# Patient Record
Sex: MALE | Race: WHITE | Employment: FULL TIME | URBAN - METROPOLITAN AREA
[De-identification: names, ages, dates, MRNs, and addresses within clinical notes are randomized per-mention and may not be internally consistent; named-entity substitution may affect disease eponyms.]

---

## 2020-09-25 ENCOUNTER — HOSPITAL ENCOUNTER (OUTPATIENT)
Dept: NEUROLOGY | Age: 45
Discharge: HOME | End: 2020-09-25
Payer: COMMERCIAL

## 2020-09-25 PROCEDURE — 95886 MUSC TEST DONE W/N TEST COMP: CPT | Performed by: PSYCHIATRY & NEUROLOGY

## 2020-09-25 PROCEDURE — 95913 NRV CNDJ TEST 13/> STUDIES: CPT | Performed by: PSYCHIATRY & NEUROLOGY

## 2020-09-25 PROCEDURE — 95887 MUSC TST DONE W/N TST NONEXT: CPT

## 2020-09-25 PROCEDURE — 95913 NRV CNDJ TEST 13/> STUDIES: CPT

## 2020-09-25 NOTE — PROCEDURES
Lauryn  22.  Electrodiagnostic Laboratory   [place]           Full Name: Antonella Parker Gender: Male  MRN: [id number] YOB: 1975  Location[de-identified] [facility] (2)      Visit Date: 9/25/2020 07:52  Age: 54 Years 3 Months Old  Examining Physician: Dr. Bobetta Seip   Referring Physician: GERMAN Simons-CNP  Technician: Alissa Patterson   Height: 5 feet 8 inch  Weight: 160 lbs; BMI 24  Notes: Neuropathy       Motor NCS      Nerve / Sites Lat. Lat Diff Amplitude Amp. 1-2 Distance Velocity Temp.    ms ms mV % cm m/s °C   L Median - APB      Wrist 4.22  8.0 100 8  32.1      Elbow 10.21 5.99 7.3 92.2 22 37 32.1   L Ulnar - ADM      Wrist 4.22  8.5 100 8  32.1      B. Elbow 8.65 4.43 8.0 93.2 21 47 32.1      A. Elbow 11.35 2.71 7.9 92.5 10 37 32.1   L Peroneal - EDB      Ankle NR  NR NR 8  31.7      Fib head NR NR NR NR   31.9   R Peroneal - EDB      Ankle 5.83  1.0 100 8  31.7      Pop fossa 19.48 13.65 0.6 60.1 39 29 31.7   L Tibial - AH      Ankle 6.41  2.9 100 8  31.1      Pop fossa 23.23 16.82 1.2 42.3 42 25 31   L Peroneal - Tib Ant      Fib Head 3.59  3.5 100   31.7      Pop fossa 6.09 2.50 3.3 95.5 10 40 31.7       Sensory NCS      Nerve / Sites Onset Lat Peak Lat PP Amp Distance Velocity Temp.    ms ms µV cm m/s °C   L Median - Digit II (Antidromic)      Mid Palm NR NR NR 7 NR 31.7      Wrist NR NR NR 14 NR 31.7   L Ulnar - Digit V (Antidromic)      Wrist 3.91 4.79 19.7 14 36 31.9   L Superficial peroneal - Ankle      Lat leg NR NR NR 10 NR 31.7   R Superficial peroneal - Ankle      Lat leg NR NR NR 10 NR 31.7   L Sural - Ankle (Calf)      Calf NR NR NR 14 NR 30.7       F  Wave      Nerve F Lat M Lat F-M Lat    ms ms ms   R Peroneal - EDB 64.8 5.6 59.2   L Tibial - AH 79.1 7.6 71.5   L Median - APB 34.1 4.7 29.4   L Ulnar - ADM 35.8 3.1 32.8       H Reflex      Nerve Lat Hmax    ms   L Tibial - Soleus 39.4   R Tibial - Soleus NR       EMG         EMG Summary Table     Spontaneous MUAP Recruitment   Muscle IA Fib PSW Fasc H.F. Amp Dur. PPP Pattern   L. Lumbar paraspinals (mid) Incr None Few None CRDs N N N N   L. Lumbar paraspinals (low) Incr None 1+ None None N N N N   L. Sacral paraspinals Incr 1+ 1+ None None N N N N   L. Gluteus medius Incr Rare None None Nascent N N N N   L. Biceps femoris (short head) N None None None 2+ Nascent N N N N   L. Vastus lateralis N None None None None N 2+ 1+ N   L. Gastrocnemius (Medial head) Incr Few None None None N 2+ 1+ Decr   L. Tibialis anterior N None None None None N 2+ 1+ Decr   L. Flexor digitorum longus Incr None Few None None N 2+ N Decr   L. Extensor hallucis longus Incr Few Few None None N 2+ 1+ Decr   L. Extensor digitorum brevis N None None None None N N N Distant MUPs   L. thigh Adductor  N None None None None N 2+ 1+ Decr   L. Iliopsoas Incr 2+ 1+ None 1+ Nascent N 1+ 1+ Sl Decr         Nerve conduction studies in the left arm revealed the following abnormalities--- minimal prolongation of the distal motor latency of the left ulnar nerve at the wrist, with moderate motor conduction velocity slowing about the elbow segments, but without decremental motor responses. Motor conduction velocities in the left median and ulnar nerve were also minimally slow. Sensory responses were not obtained from the left median nerve. The distal sensory latency of the left ulnar nerve was delayed, with decreased antidromic sensory nerve conduction velocities. The left median and ulnar F-wave latencies were also prolonged. Nerve testings in both legs disclosed the following---absent superficial peroneal and left sural sensory nerve potentials. The left peroneal motor potential was absent, recording of the extensor digitorum brevis muscle, but obtained over the tibialis anterior.   The distal motor latencies of the right peroneal and left tibial nerves were delayed, with decreased compound motor action potentials and moderately decreased motor conduction velocities. The F-wave latencies of the right peroneal and left tibial nerves were markedly prolonged. The right H reflex was not obtained; the left H reflex was also delayed. These findings were compared to the referential values in this laboratory, available upon request.    Monopolar needle examination of the left leg revealed acute and chronic denervation changes in all muscles examined, more so distally. Needle testing of the paraspinals found acute and chronic denervation changes. Electrodiagnostic examination of the left arm and both legs disclosed evidence diagnostic of the following    1. Diffuse, symmetrical sensorimotor peripheral neuropathy of the axonal degenerative type, of marked severity. 2.  Diffuse left-sided lumbosacral motor radiculopathies or intracanalicular lesions, involving at least the L3-S1 motor roots. These were proven with the abnormal needle testing of the paraspinals. There were no other peripheral neuropathies. There were no other motor radiculopathies or intracanalicular lesions. Sensory radiculopathies cannot be evaluated by electrodiagnostic    Clinically, patient presented with longstanding, poorly controlled diabetes mellitus for many years. He recently noted increasing weakness of his left leg. On brief neurological examination, he displayed marked sensory loss to both mid thighs and both wrists, with weakness of both calf and foot muscles. There were also hammertoes in the left foot. He walks with bilateral foot drop. His difficulties are related to his severe diabetic polyneuropathy. In addition, his lumbosacral disease is contributing. His lumbosacral radiculopathy may be the result of diabetes alone versus compressive lesions. MRIs of the lumbosacral spine were recommended. Clinical correlation was highly advised.

## 2020-11-09 ENCOUNTER — HOSPITAL ENCOUNTER (OUTPATIENT)
Dept: GENERAL RADIOLOGY | Age: 45
Discharge: HOME | End: 2020-11-11
Payer: COMMERCIAL

## 2020-11-09 ENCOUNTER — HOSPITAL ENCOUNTER (OUTPATIENT)
Age: 45
Discharge: HOME | End: 2020-11-11
Payer: COMMERCIAL

## 2020-11-09 PROCEDURE — 71046 X-RAY EXAM CHEST 2 VIEWS: CPT

## 2020-11-25 ENCOUNTER — HOSPITAL ENCOUNTER (OUTPATIENT)
Dept: ULTRASOUND IMAGING | Age: 45
Discharge: HOME | End: 2020-11-27
Payer: COMMERCIAL

## 2020-11-25 PROCEDURE — 76700 US EXAM ABDOM COMPLETE: CPT

## 2020-12-02 ENCOUNTER — HOSPITAL ENCOUNTER (OUTPATIENT)
Dept: MRI IMAGING | Age: 45
Discharge: HOME | End: 2020-12-04
Payer: COMMERCIAL

## 2020-12-02 PROCEDURE — 72148 MRI LUMBAR SPINE W/O DYE: CPT

## 2021-04-28 NOTE — PROGRESS NOTES
[address] [facility]. Pierpont YUSEF Jasso, F.A.C.P. Sarah Fu, CALISTA, APRN, CNS  Jordan Beltran. Cathryn Cristobal, MSN, APRN-FNP-C  Berry Carlson MSN, APRN, FNP-C  Gina CALDERA PA-C  [address] MSN, APRN, FNP-C  [address], [address]  [place], [address] [address]  Phone: [phone]  Fax: [phone]       Humera Luu is a 54 y.o. right handed male     Past Medical History:     Past Medical History:   Diagnosis Date    Anxiety     HLD (hyperlipidemia)     HTN (hypertension)     OCD (obsessive compulsive disorder)     Type 2 diabetes mellitus ([facility] [address]      Past Surgical History:       Past Surgical History:   Procedure Laterality Date    FOOT TENDON SURGERY Right     INGUINAL HERNIA REPAIR      WRIST SURGERY Right      Allergies:       NKDA    Medications:     Current Outpatient Medications   Medication Sig Dispense Refill    DULoxetine (CYMBALTA) 60 MG extended release capsule Take 1 capsule by mouth daily      fluvoxaMINE (LUVOX) 50 MG tablet Take 1 tablet by mouth daily      lisinopril (PRINIVIL;ZESTRIL) 5 MG tablet Take 1 tablet by mouth daily      traZODone (DESYREL) 50 MG tablet Take 2 tablets by mouth nightly      atorvastatin (LIPITOR) 40 MG tablet Take 1 tablet by mouth daily      hydrOXYzine (VISTARIL) 25 MG capsule Take 1 capsule by mouth 3 times daily      LANTUS SOLOSTAR 100 UNIT/ML injection pen 20 Units every morning      metFORMIN (GLUCOPHAGE) 500 MG tablet Take 1 tablet by mouth 2 times daily      Cholecalciferol (VITAMIN D-3) 25 MCG (1000 UT) CAPS Take by mouth daily      Multiple Vitamins-Minerals (MULTIVITAMIN ADULTS PO) Take by mouth daily       No current facility-administered medications for this visit.       Social History:         with no biological children  Not working    Quit smoking 3 years ago--1/2 ppd 5 years; no ETOH, uses med marijuana    Review of Systems:     No chest pain or palpitations  No SOB  No vertigo, lightheadedness or loss of consciousness  No falls, tripping or stumbling  No incontinence of bowels or bladder  No itching or bruising appreciated  +numbness/tingling to BLE  + low back pain    ROS is otherwise negative    Family History:     No family history on file. History of Present Illness: The patient was referred by KIRILL Juares for neuropathy    He presents alone is a good historian with a significant past medical history of type 2 diabetes, HTN, HLD, OCD, anxiety. He was an uncontrolled diabetic for years, with hemoglobin A1cs >15. For the past 2 years, this has significantly improved, and A1Cs are now in the 7s. He has had numbness and tingling in both legs for several years, but this worsened over the past year and he now has difficulty walking--he feels off balance. He feels a tightness and locking feeling in both calves. He also notes stiffness and tightness in his low back, as well as a tingling sensation on the skin in his stomach while taking a shower. EDX/studies in Sept 2020 showed a severe diabetic axonal peripheral neuropathy as well as suggestion of left lumbosacral radiculopathy. MRI of the lumbar spine showed no major stenosis, rather a possible annular tear at L4-L5. He has been getting low back injections with , which has been somewhat helpful. He has never been through physical therapy. PCP started gabapentin for his dysesthesias, but this gave him suicidal thoughts. He is now on Cymbalta 60 mg and notes a 50% improvement. His wife checks his feet at night and he wears diabetic shoes. He does not exercise on his own due to his difficulties walking. No history of low back injuries to his knowledge or any other significant neurologic history.     Objective:     BP (!) 148/90 (Site: Left Upper Arm, Position: Sitting, Cuff Size: Medium Adult)   Pulse 73   Temp 97.9 °F (36.6 °C) (Infrared)   Resp 12   Ht 5' 9\" (1.753 m)   Wt 160 lb (72.6 kg)   SpO2 98% BMI 23.63 kg/m²     General appearance: alert, appears stated age, cooperative and in no distress  Head: normocephalic, without obvious abnormality, atraumatic  Eyes: conjunctivae/corneas clear; no drainage  Neck: no carotid bruit, limited ROM without cervicalgia  Back: symmetric, no curvature.  ROM normal.   Lungs: clear to auscultation bilaterally  Heart: regular rate and rhythm---no murmur  Abdomen: soft, non-tender; bowel sounds normal; no masses,  no organomegaly  Extremities: scattered areas of decreased hair growth up to mid calves; no cyanosis or edema  Pulses: 2+ and symmetric  Skin: Onychomycosis; mild stasis dermatitis to shins      Mental Status: alert and oriented x 4    Appropriate attention/concentration  Intact fundus of knowledge  Memories intact    Speech: no dysarthria  Language: no aphasias    Cranial Nerves:  I: smell    II: visual acuity     II: visual fields Full    II: pupils TIERA   III,VII: ptosis None   III,IV,VI: extraocular muscles  EOMI without nystagmus   V: mastication Normal   V: facial light touch sensation  Normal   V,VII: corneal reflex     VII: facial muscle function - upper  Normal   VII: facial muscle function - lower Normal   VIII: hearing Normal   IX: soft palate elevation  Normal   IX,X: gag reflex    XI: trapezius strength  5/5   XI: sternocleidomastoid strength 5/5   XI: neck extension strength  5/5   XII: tongue strength  Normal     Motor:  5/5 throughout except 4/5 b/l ant tibs and gastrocs  Decreased bulk in calves; normal  tone  No drift   No abnormal movements    Sensory:  LT \"feels strange\" BLE; normal in BUE  PP decreased in stocking glove distribution to mid shins bilaterally; normal BUE   Vibration moderately impaired at both wrists and significantly impaired at both ankles    Coordination:   FN, FFM and JADYN normal  HS slow but not ataxic bilaterally    Gait:  Wide-based; slow and antalgic    DTR:   Right Brachioradialis reflex 1+  Left Brachioradialis reflex 1+  Right Biceps reflex 1+  Left Biceps reflex 1+  Right Triceps reflex 1+  Left Triceps reflex 1+  Right Quadriceps reflex 3+  Left Quadriceps reflex 3+  Right Achilles reflex 2+  Left Achilles reflex 2+    No Babinskis  No Minor's    No other pathological reflexes    Laboratory/Radiology:  ry/Radiology:     Referral records reviewed under media tab    MRI lumbar spine Dec 2020: No fracture or malalignment. 2. Focus of abnormal signal associated with left paracentral annulus at level of L4/L5 which could indicate annular tear. 3. Mild circumferential central canal stenosis at level of L4/L5. 4. Mild right neural foraminal narrowing at L3/L4 and L4/L5. EMG 9/2020: Diffuse, symmetrical sensorimotor peripheral neuropathy of the axonal degenerative type, of marked severity. 2.  Diffuse left-sided lumbosacral motor radiculopathies or intracanalicular lesions, involving at least the L3-S1 motor roots. These were proven with the abnormal needle testing of the paraspinals. There were no other peripheral neuropathies. There were no other motor radiculopathies or intracanalicular lesions. Sensory radiculopathies cannot be evaluated by electrodiagnostic    All labs and images were personally reviewed at the time of this visit    Assessment:     Severe axonal sensorimotor diabetic peripheral neuropathy: Secondary to years of uncontrolled diabetes, and causing dysesthesias and sensory abnormalities in both legs. Pains under fairly good control on Cymbalta, but addition of alpha lipoic acid may also help. He would benefit from regular physical exercise for balance and gait training. Thankfully, his diabetes is now under control    LS radiculopathy: Possibly also due to diabetes as there were no major structural abnormalities on his MRI of the lumbar spine.   Small annular tear L4/L5 is most likely causing his low back pains, which may also benefit from PT    Diabetes mellitus type 2    Plan:     PT [facility]    Alpha lipoic acid 600 mg daily    Cymbalta per PCP    Keep diabetes under control    Encouraged independent exercise    Diabetic foot care and fall precautions reviewed    RTO in 6 mos or sooner MANUELA Ortiz, APRN-CNP  11:51 AM  4/28/2021    I spent 44 minutes with this patient obtaining the HPI and discussing the exam with greater than 50% of the time providing counseling and education on medications and other treatment plans. All questions were answered prior to leaving my office.

## 2021-04-29 ENCOUNTER — OFFICE VISIT (OUTPATIENT)
Dept: NEUROLOGY | Age: 46
End: 2021-04-29
Payer: COMMERCIAL

## 2021-04-29 VITALS
HEART RATE: 73 BPM | WEIGHT: 160 LBS | SYSTOLIC BLOOD PRESSURE: 148 MMHG | RESPIRATION RATE: 12 BRPM | BODY MASS INDEX: 23.7 KG/M2 | DIASTOLIC BLOOD PRESSURE: 90 MMHG | OXYGEN SATURATION: 98 % | TEMPERATURE: 97.9 F | HEIGHT: 69 IN

## 2021-04-29 DIAGNOSIS — E11.42 DIABETIC PERIPHERAL NEUROPATHY (HCC): Primary | ICD-10-CM

## 2021-04-29 DIAGNOSIS — M54.17 L-S RADICULOPATHY: ICD-10-CM

## 2021-04-29 PROCEDURE — 99204 OFFICE O/P NEW MOD 45 MIN: CPT | Performed by: NURSE PRACTITIONER

## 2021-04-29 RX ORDER — ATORVASTATIN CALCIUM 40 MG/1
1 TABLET, FILM COATED ORAL DAILY
COMMUNITY
Start: 2021-04-15

## 2021-04-29 RX ORDER — FLUVOXAMINE MALEATE 50 MG/1
1 TABLET, COATED ORAL DAILY
COMMUNITY
Start: 2021-04-15

## 2021-04-29 RX ORDER — INSULIN GLARGINE 100 [IU]/ML
20 INJECTION, SOLUTION SUBCUTANEOUS EVERY MORNING
COMMUNITY
Start: 2021-02-08

## 2021-04-29 RX ORDER — LISINOPRIL 5 MG/1
1 TABLET ORAL DAILY
COMMUNITY
Start: 2021-04-15

## 2021-04-29 RX ORDER — CHOLECALCIFEROL (VITAMIN D3) 25 MCG
1 CAPSULE ORAL DAILY
COMMUNITY

## 2021-04-29 RX ORDER — PERPHENAZINE 16 MG
600 TABLET ORAL DAILY
Qty: 30 CAPSULE | Refills: 11 | Status: SHIPPED
Start: 2021-04-29 | End: 2022-06-23

## 2021-04-29 RX ORDER — HYDROXYZINE PAMOATE 25 MG/1
1 CAPSULE ORAL 3 TIMES DAILY
COMMUNITY
Start: 2021-04-15

## 2021-04-29 RX ORDER — DULOXETIN HYDROCHLORIDE 60 MG/1
1 CAPSULE, DELAYED RELEASE ORAL DAILY
COMMUNITY
Start: 2021-04-10

## 2021-04-29 RX ORDER — TRAZODONE HYDROCHLORIDE 50 MG/1
2 TABLET ORAL NIGHTLY
COMMUNITY
Start: 2021-03-23

## 2021-04-29 SDOH — HEALTH STABILITY: MENTAL HEALTH: HOW OFTEN DO YOU HAVE A DRINK CONTAINING ALCOHOL?: NEVER

## 2021-04-29 SDOH — HEALTH STABILITY: MENTAL HEALTH: HOW MANY STANDARD DRINKS CONTAINING ALCOHOL DO YOU HAVE ON A TYPICAL DAY?: NOT ASKED

## 2021-04-29 NOTE — PATIENT INSTRUCTIONS
Patient Education        Diabetic Neuropathy: Care Instructions  Your Care Instructions     When you have diabetes, your blood sugar level may get too high. Over time, high blood sugar levels can damage nerves. This is called diabetic neuropathy. Nerve damage can cause pain, burning, tingling, and numbness and may leave you feeling weak. The feet are often affected. When you have nerve damage in your feet, you cannot feel your feet and toes as well as normal and may not notice cuts or sores. Even a small injury can lead to a serious infection. It is very important that you follow your doctor's advice on foot care. Sometimes diabetes damages nerves that help the body function. If this happens, your blood pressure, sweating, digestion, and urination might be affected. Your doctor may give you a target blood sugar level that is higher or lower than you are used to. Try to keep your blood sugar very close to this target level to prevent more damage. Follow-up care is a key part of your treatment and safety. Be sure to make and go to all appointments, and call your doctor if you are having problems. It's also a good idea to know your test results and keep a list of the medicines you take. How can you care for yourself at home? · Take your medicines exactly as prescribed. Call your doctor if you think you are having a problem with your medicine. It is very important that you take your insulin or diabetes pills as your doctor tells you. · Try to keep blood sugar at your target level. ? Eat a variety of healthy foods, with carbohydrate spread out in your meals. A dietitian can help you plan meals. ? Try to get at least 30 minutes of exercise on most days. ? Check your blood sugar as many times each day as your doctor recommends. · Take and record your blood pressure at home if your doctor tells you to. Learn the importance of the two measures of blood pressure (such as 130 over 80, or 130/80).  To take your blood pressure at home:  ? Ask your doctor to check your blood pressure monitor to be sure it is accurate and the cuff fits you. Also ask your doctor to watch you to make sure that you are using it right. ? Do not use medicine known to raise blood pressure (such as some nasal decongestant sprays) before taking your blood pressure. ? Avoid taking your blood pressure if you have just exercised or are nervous or upset. Rest at least 15 minutes before you take a reading. · Take pain medicines exactly as directed. ? If the doctor gave you a prescription medicine for pain, take it as prescribed. ? If you are not taking a prescription pain medicine, ask your doctor if you can take an over-the-counter medicine. · Do not smoke. Smoking can increase your chance for a heart attack or stroke. If you need help quitting, talk to your doctor about stop-smoking programs and medicines. These can increase your chances of quitting for good. · Limit alcohol to 2 drinks a day for men and 1 drink a day for women. Too much alcohol can cause health problems. · Eat small meals often, rather than 2 or 3 large meals a day. To care for your feet  · Prevent injury by wearing shoes at all times, even when you are indoors. · Do foot care as part of your daily routine. Wash your feet and then rub lotion on your feet, but not between your toes. Use a handheld mirror or magnifying mirror to inspect your feet for blisters, cuts, cracks, or sores. · Have your toenails trimmed and filed straight across. · Wear shoes and socks that fit well. Soft shoes that have good support and that fit well (such as tennis shoes) are best for your feet. · Check your shoes for any loose objects or rough edges before you put them on. · Ask your doctor to check your feet during each visit. Your doctor may notice a foot problem you have missed. · Get early treatment for any foot problem, even a minor one. When should you call for help?    Call your doctor now or seek immediate medical care if:    · You have symptoms of infection, such as:  ? Increased pain, swelling, warmth, or redness. ? Red streaks leading from the area. ? Pus draining from the area. ? A fever.     · You have new or worse numbness, pain, or tingling in any part of your body. Watch closely for changes in your health, and be sure to contact your doctor if:    · You have a new problem with your feet, such as:  ? A new sore or ulcer. ? A break in the skin that is not healing after several days. ? Bleeding corns or calluses. ? An ingrown toenail.     · You do not get better as expected. Where can you learn more? Go to https://BeFunky.Chiasma. org and sign in to your Everplans account. Enter L428 in the BeQuan box to learn more about \"Diabetic Neuropathy: Care Instructions. \"     If you do not have an account, please click on the \"Sign Up Now\" link. Current as of: August 31, 2020               Content Version: 12.8  © 0607-2839 Healthwise, Incorporated. Care instructions adapted under license by [facility] ([facility]). If you have questions about a medical condition or this instruction, always ask your healthcare professional. [address] any warranty or liability for your use of this information.

## 2021-04-30 ENCOUNTER — TELEPHONE (OUTPATIENT)
Dept: NEUROLOGY | Age: 46
End: 2021-04-30

## 2021-04-30 NOTE — TELEPHONE ENCOUNTER
Copy of neuro consult faxed through [employer] to [facility], APRN.   Electronically signed by Red Carlson on 4/30/21 at 3:46 PM EDT

## 2021-05-10 ENCOUNTER — HOSPITAL ENCOUNTER (OUTPATIENT)
Dept: PHYSICAL THERAPY | Age: 46
Setting detail: THERAPIES SERIES
Discharge: HOME | End: 2021-05-10
Payer: COMMERCIAL

## 2021-05-10 PROCEDURE — 97161 PT EVAL LOW COMPLEX 20 MIN: CPT

## 2021-05-10 NOTE — PROGRESS NOTES
Physical Therapy  Initial Assessment  Date: 5/10/2021  Patient Name: Miguel Holbrook  MRN: [id number]  : 1975          Restrictions       Subjective   General  Chart Reviewed: Yes  Patient assessed for rehabilitation services?: Yes  Additional Pertinent Hx: Patient presents to PT to assess and address issues related to chronic bilateral lower leg Neuropathy symptoms. Symptoms have been present 5+ years with progressive worsening  Family / Caregiver Present: No  Referring Practitioner: Nancy Mayer CNP  Referral Date : 21  Diagnosis: Peripheral Neuropathy  Follows Commands: Within Functional Limits  PT Visit Information  Onset Date: 21  PT Insurance Information: BC/BS  Subjective  Subjective: Altered sensation is constant Limited mobility/strength of bilateral feet/ankles Impaired mobility Deficits of balance Pain/sensitivity in the lower legs  Pain Screening  Patient Currently in Pain: Yes  Vital Signs  Patient Currently in Pain: Yes    Vision/Hearing  Vision  Vision: Within Functional Limits  Hearing  Hearing: Within functional limits    Orientation  Orientation  Overall Orientation Status: Within Functional Limits    Social/Functional History  Social/Functional History  Lives With: Family  Type of Home: House  Home Layout: Multi-level  Receives Help From: Family  Homemaking Responsibilities: Yes  Active : Yes  Mode of Transportation: Car  Occupation: Unemployed    Objective     Observation/Palpation  Posture: Fair  Observation: Gait: Antalgic Wide base of support with reduced step length Limited hip/knee flexion noted with ambulation. Movements are hesitant and guarded with patient often seeking handholds to secure balance.  Movements are controlled but require significant effort and result in rapid onset of fatigue  Edema: None noted    AROM RLE (degrees)  RLE General AROM: Limited ankle/foot mobility  AROM LLE (degrees)  LLE General AROM: Limited ankle/fioot mobility    Strength RLE  Strength RLE: Time Out 1540         Minutes 40         Timed Code Treatment Minutes: [address], PT

## 2021-07-04 ENCOUNTER — APPOINTMENT (OUTPATIENT)
Dept: CT IMAGING | Age: 46
End: 2021-07-04
Payer: COMMERCIAL

## 2021-07-04 ENCOUNTER — HOSPITAL ENCOUNTER (EMERGENCY)
Age: 46
Discharge: HOME | End: 2021-07-04
Attending: EMERGENCY MEDICINE
Payer: COMMERCIAL

## 2021-07-04 ENCOUNTER — APPOINTMENT (OUTPATIENT)
Dept: GENERAL RADIOLOGY | Age: 46
End: 2021-07-04
Payer: COMMERCIAL

## 2021-07-04 VITALS
HEART RATE: 62 BPM | DIASTOLIC BLOOD PRESSURE: 69 MMHG | BODY MASS INDEX: 23.63 KG/M2 | OXYGEN SATURATION: 99 % | TEMPERATURE: 97.7 F | SYSTOLIC BLOOD PRESSURE: 107 MMHG | HEIGHT: 69 IN | RESPIRATION RATE: 16 BRPM

## 2021-07-04 DIAGNOSIS — R11.2 NON-INTRACTABLE VOMITING WITH NAUSEA, UNSPECIFIED VOMITING TYPE: Primary | ICD-10-CM

## 2021-07-04 LAB
ALBUMIN SERPL-MCNC: 4.7 G/DL (ref 3.5–5.2)
ALP BLD-CCNC: 90 U/L (ref 40–129)
ALT SERPL-CCNC: 15 U/L (ref 0–40)
AMPHETAMINE SCREEN, URINE: NOT DETECTED
ANION GAP SERPL CALCULATED.3IONS-SCNC: 12 MMOL/L (ref 7–16)
AST SERPL-CCNC: 16 U/L (ref 0–39)
BACTERIA: NORMAL /HPF
BARBITURATE SCREEN URINE: NOT DETECTED
BASOPHILS ABSOLUTE: 0.03 E9/L (ref 0–0.2)
BASOPHILS RELATIVE PERCENT: 0.4 % (ref 0–2)
BENZODIAZEPINE SCREEN, URINE: NOT DETECTED
BILIRUB SERPL-MCNC: 0.8 MG/DL (ref 0–1.2)
BILIRUBIN URINE: NEGATIVE
BLOOD, URINE: NEGATIVE
BUN BLDV-MCNC: 18 MG/DL (ref 6–20)
CALCIUM SERPL-MCNC: 9.8 MG/DL (ref 8.6–10.2)
CANNABINOID SCREEN URINE: POSITIVE
CHLORIDE BLD-SCNC: 94 MMOL/L (ref 98–107)
CHP ED QC CHECK: YES
CLARITY: CLEAR
CO2: 28 MMOL/L (ref 22–29)
COCAINE METABOLITE SCREEN URINE: NOT DETECTED
COLOR: YELLOW
CREAT SERPL-MCNC: 1.1 MG/DL (ref 0.7–1.2)
EKG ATRIAL RATE: 62 BPM
EKG ATRIAL RATE: 63 BPM
EKG P-R INTERVAL: 136 MS
EKG P-R INTERVAL: 140 MS
EKG Q-T INTERVAL: 410 MS
EKG Q-T INTERVAL: 430 MS
EKG QRS DURATION: 80 MS
EKG QRS DURATION: 82 MS
EKG QTC CALCULATION (BAZETT): 419 MS
EKG QTC CALCULATION (BAZETT): 436 MS
EKG R AXIS: 23 DEGREES
EKG R AXIS: 57 DEGREES
EKG T AXIS: 24 DEGREES
EKG T AXIS: 51 DEGREES
EKG VENTRICULAR RATE: 62 BPM
EKG VENTRICULAR RATE: 63 BPM
EOSINOPHILS ABSOLUTE: 0.03 E9/L (ref 0.05–0.5)
EOSINOPHILS RELATIVE PERCENT: 0.4 % (ref 0–6)
FENTANYL SCREEN, URINE: NOT DETECTED
GFR AFRICAN AMERICAN: >60
GFR NON-AFRICAN AMERICAN: >60 ML/MIN/1.73
GLUCOSE BLD-MCNC: 179 MG/DL
GLUCOSE BLD-MCNC: 211 MG/DL (ref 74–99)
GLUCOSE URINE: NEGATIVE MG/DL
HCT VFR BLD CALC: 45.1 % (ref 37–54)
HEMOGLOBIN: 15.2 G/DL (ref 12.5–16.5)
HYALINE CASTS: NORMAL /LPF (ref 0–2)
IMMATURE GRANULOCYTES #: 0.02 E9/L
IMMATURE GRANULOCYTES %: 0.2 % (ref 0–5)
KETONES, URINE: 15 MG/DL
LEUKOCYTE ESTERASE, URINE: NEGATIVE
LIPASE: 112 U/L (ref 13–60)
LYMPHOCYTES ABSOLUTE: 2.94 E9/L (ref 1.5–4)
LYMPHOCYTES RELATIVE PERCENT: 35.6 % (ref 20–42)
Lab: ABNORMAL
MCH RBC QN AUTO: 29.4 PG (ref 26–35)
MCHC RBC AUTO-ENTMCNC: 33.7 % (ref 32–34.5)
MCV RBC AUTO: 87.2 FL (ref 80–99.9)
METER GLUCOSE: 179 MG/DL (ref 74–99)
METHADONE SCREEN, URINE: NOT DETECTED
MONOCYTES ABSOLUTE: 0.49 E9/L (ref 0.1–0.95)
MONOCYTES RELATIVE PERCENT: 5.9 % (ref 2–12)
NEUTROPHILS ABSOLUTE: 4.76 E9/L (ref 1.8–7.3)
NEUTROPHILS RELATIVE PERCENT: 57.5 % (ref 43–80)
NITRITE, URINE: NEGATIVE
OPIATE SCREEN URINE: NOT DETECTED
OXYCODONE URINE: NOT DETECTED
PDW BLD-RTO: 12.1 FL (ref 11.5–15)
PH UA: 6.5 (ref 5–9)
PHENCYCLIDINE SCREEN URINE: NOT DETECTED
PLATELET # BLD: 364 E9/L (ref 130–450)
PMV BLD AUTO: 8.4 FL (ref 7–12)
POTASSIUM REFLEX MAGNESIUM: 4.1 MMOL/L (ref 3.5–5)
PROTEIN UA: ABNORMAL MG/DL
RBC # BLD: 5.17 E12/L (ref 3.8–5.8)
RBC UA: NORMAL /HPF (ref 0–2)
SODIUM BLD-SCNC: 134 MMOL/L (ref 132–146)
SPECIFIC GRAVITY UA: 1.01 (ref 1–1.03)
TOTAL PROTEIN: 8.2 G/DL (ref 6.4–8.3)
UROBILINOGEN, URINE: 0.2 E.U./DL
WBC # BLD: 8.3 E9/L (ref 4.5–11.5)
WBC UA: NORMAL /HPF (ref 0–5)

## 2021-07-04 PROCEDURE — 6360000002 HC RX W HCPCS: Performed by: STUDENT IN AN ORGANIZED HEALTH CARE EDUCATION/TRAINING PROGRAM

## 2021-07-04 PROCEDURE — 83690 ASSAY OF LIPASE: CPT

## 2021-07-04 PROCEDURE — 2580000003 HC RX 258: Performed by: STUDENT IN AN ORGANIZED HEALTH CARE EDUCATION/TRAINING PROGRAM

## 2021-07-04 PROCEDURE — 71045 X-RAY EXAM CHEST 1 VIEW: CPT

## 2021-07-04 PROCEDURE — 93010 ELECTROCARDIOGRAM REPORT: CPT | Performed by: INTERNAL MEDICINE

## 2021-07-04 PROCEDURE — 96375 TX/PRO/DX INJ NEW DRUG ADDON: CPT

## 2021-07-04 PROCEDURE — 80307 DRUG TEST PRSMV CHEM ANLYZR: CPT

## 2021-07-04 PROCEDURE — 80053 COMPREHEN METABOLIC PANEL: CPT

## 2021-07-04 PROCEDURE — 6360000004 HC RX CONTRAST MEDICATION: Performed by: EMERGENCY MEDICINE

## 2021-07-04 PROCEDURE — 74177 CT ABD & PELVIS W/CONTRAST: CPT

## 2021-07-04 PROCEDURE — 96374 THER/PROPH/DIAG INJ IV PUSH: CPT

## 2021-07-04 PROCEDURE — 81001 URINALYSIS AUTO W/SCOPE: CPT

## 2021-07-04 PROCEDURE — 93005 ELECTROCARDIOGRAM TRACING: CPT | Performed by: STUDENT IN AN ORGANIZED HEALTH CARE EDUCATION/TRAINING PROGRAM

## 2021-07-04 PROCEDURE — 99284 EMERGENCY DEPT VISIT MOD MDM: CPT

## 2021-07-04 PROCEDURE — 85025 COMPLETE CBC W/AUTO DIFF WBC: CPT

## 2021-07-04 PROCEDURE — 2500000003 HC RX 250 WO HCPCS: Performed by: STUDENT IN AN ORGANIZED HEALTH CARE EDUCATION/TRAINING PROGRAM

## 2021-07-04 RX ORDER — FAMOTIDINE 20 MG/1
20 TABLET, FILM COATED ORAL 2 TIMES DAILY
Qty: 60 TABLET | Refills: 0 | Status: SHIPPED | OUTPATIENT
Start: 2021-07-04 | End: 2022-06-23

## 2021-07-04 RX ORDER — DIPHENHYDRAMINE HYDROCHLORIDE 50 MG/ML
25 INJECTION INTRAMUSCULAR; INTRAVENOUS ONCE
Status: COMPLETED | OUTPATIENT
Start: 2021-07-04 | End: 2021-07-04

## 2021-07-04 RX ORDER — ONDANSETRON 4 MG/1
4 TABLET, FILM COATED ORAL 3 TIMES DAILY PRN
Qty: 15 TABLET | Refills: 0 | Status: SHIPPED | OUTPATIENT
Start: 2021-07-04 | End: 2022-06-23

## 2021-07-04 RX ORDER — METOCLOPRAMIDE HYDROCHLORIDE 5 MG/ML
10 INJECTION INTRAMUSCULAR; INTRAVENOUS ONCE
Status: COMPLETED | OUTPATIENT
Start: 2021-07-04 | End: 2021-07-04

## 2021-07-04 RX ORDER — 0.9 % SODIUM CHLORIDE 0.9 %
1000 INTRAVENOUS SOLUTION INTRAVENOUS ONCE
Status: COMPLETED | OUTPATIENT
Start: 2021-07-04 | End: 2021-07-04

## 2021-07-04 RX ADMIN — IOPAMIDOL 75 ML: 755 INJECTION, SOLUTION INTRAVENOUS at 11:37

## 2021-07-04 RX ADMIN — DIPHENHYDRAMINE HYDROCHLORIDE 25 MG: 50 INJECTION INTRAMUSCULAR; INTRAVENOUS at 10:01

## 2021-07-04 RX ADMIN — METOCLOPRAMIDE 10 MG: 5 INJECTION, SOLUTION INTRAMUSCULAR; INTRAVENOUS at 10:01

## 2021-07-04 RX ADMIN — FAMOTIDINE 20 MG: 10 INJECTION, SOLUTION INTRAVENOUS at 10:01

## 2021-07-04 RX ADMIN — SODIUM CHLORIDE 1000 ML: 9 INJECTION, SOLUTION INTRAVENOUS at 10:00

## 2021-07-04 ASSESSMENT — ENCOUNTER SYMPTOMS
EYE REDNESS: 0
CHEST TIGHTNESS: 0
TROUBLE SWALLOWING: 0
EYE PAIN: 0
VOMITING: 1
PHOTOPHOBIA: 0
SORE THROAT: 0
RHINORRHEA: 0
DIARRHEA: 0
APNEA: 0
BACK PAIN: 0
SHORTNESS OF BREATH: 0
COUGH: 0
WHEEZING: 0
ABDOMINAL PAIN: 1
CONSTIPATION: 0
NAUSEA: 1

## 2021-07-04 NOTE — ED PROVIDER NOTES
[address]      Pt Name: Loly Jackson  MRN: [id number]  [place] 1975  Date of evaluation: 7/4/2021      CHIEF COMPLAINT       Chief Complaint   Patient presents with    Emesis     x 1 week    Other     having blood sugar problems        HPI  Loly Jackson is a 51 y.o. male history of hypertension, hyperlipidemia, type 2 diabetes who presents to the emergency [facility] with nausea and vomiting. Patient states over the last week or so he has had near constant nausea and multiple episodes of vomiting. States he has been unable to keep down food. States he has near constant nausea. Describes as constant, moderate, nothing makes it better or worse. He states that he is insulin-dependent type 2 diabetic. States he has had several episodes where he becomes hypoglycemic. He states that he is a marijuana card and uses marijuana frequently. He states over the last 2 years he unintentionally has lost about 100 pounds. He follows with his PCP and states has had multiple tests done to try to figure out why he does not have an appetite and why he has intermittent nausea and vomiting. Came to the ED today because he felt his nausea was unbearable. Except as noted above the remainder of the review of systems was reviewed and negative. Review of Systems   Constitutional: Negative for activity change, chills, diaphoresis, fatigue and fever. HENT: Negative for rhinorrhea, sore throat and trouble swallowing. Eyes: Negative for photophobia, pain and redness. Respiratory: Negative for apnea, cough, chest tightness, shortness of breath and wheezing. Cardiovascular: Negative for chest pain, palpitations and leg swelling. Gastrointestinal: Positive for abdominal pain, nausea and vomiting. Negative for constipation and diarrhea. Endocrine: Negative for polyuria.    Genitourinary: Negative for difficulty urinating and dysuria. Musculoskeletal: Negative for back pain, neck pain and neck stiffness. Skin: Negative for pallor and rash. Neurological: Negative for dizziness, syncope, weakness, light-headedness and numbness. Psychiatric/Behavioral: Negative for confusion. The patient is not nervous/anxious. Physical Exam  Vitals and nursing note reviewed. Constitutional:       General: He is not in acute distress. Appearance: He is well-developed. Comments: Awake and alert. Sitting in the gurney in no obvious distress. HENT:      Head: Normocephalic and atraumatic. Mouth/Throat:      Mouth: Mucous membranes are moist.      Pharynx: No oropharyngeal exudate. Eyes:      General: No scleral icterus. Pupils: Pupils are equal, round, and reactive to light. Cardiovascular:      Rate and Rhythm: Normal rate and regular rhythm. Heart sounds: Normal heart sounds. No murmur heard. Comments: 2+ radial and dorsal pedis pulses bilaterally  Pulmonary:      Effort: Pulmonary effort is normal. No respiratory distress. Breath sounds: Normal breath sounds. No wheezing. Abdominal:      Palpations: Abdomen is soft. Tenderness: There is no abdominal tenderness. There is no guarding or rebound. Musculoskeletal:         General: No tenderness or deformity. Normal range of motion. Cervical back: Normal range of motion and neck supple. Right lower leg: No edema. Left lower leg: No edema. Skin:     General: Skin is warm and dry. Capillary Refill: Capillary refill takes less than 2 seconds. Findings: No rash. Neurological:      General: No focal deficit present. Mental Status: He is alert and oriented to person, place, and time. Cranial Nerves: No cranial nerve deficit. Sensory: No sensory deficit. Motor: No weakness or abnormal muscle tone.    Psychiatric:         Mood and Affect: Mood normal.         Behavior: Behavior normal.          Procedures MDM   This is a 79-year-old male with history of hyperlipidemia, hypertension, type 2 diabetes who presents to the emergency department with nausea and vomiting. In the emergency department patient is awake and alert, hemodynamic stable, afebrile in no respiratory distress. CT abdomen pelvis shows no acute intra-abdominal pathology. Lipase not consistent with acute pancreatitis. No signs of biliary etiology. Patient feeling much better after supportive therapy with Pepcid and Zofran in the ED. Abdominal exam repeated and showed again soft benign abdomen. Metabolic panel showed normal electrolytes, normal renal function. Patient not in DKA. Talk screen positive for THC. Had an extensive discussion with the patient about cannabinoid hyperemesis syndrome and that some of his symptoms may be related to this. Discussed with him plan for close outpatient follow-up as well as return precautions patient understands and agrees with plan. Is able to tolerate p.o. without difficulty prior to discharge.                    --------------------------------------------- PAST HISTORY ---------------------------------------------  Past Medical History:  has a past medical history of Anxiety, HLD (hyperlipidemia), HTN (hypertension), OCD (obsessive compulsive disorder), and Type 2 diabetes mellitus ([facility] Past Surgical History:  has a past surgical history that includes Wrist surgery (Right); Inguinal hernia repair; and Foot Tendon Surgery (Right). Social History:  reports that he has never smoked. His smokeless tobacco use includes chew. He reports current drug use. Drug: Marijuana. He reports that he does not drink alcohol. Family History: family history includes Diabetes in his father. The patients home medications have been reviewed. Allergies: Patient has no known allergies.     -------------------------------------------------- RESULTS -------------------------------------------------  Labs:  Results for orders placed or performed during the hospital encounter of 07/04/21   CBC Auto Differential   Result Value Ref Range    WBC 8.3 4.5 - 11.5 E9/L    RBC 5.17 3.80 - 5.80 E12/L    Hemoglobin 15.2 12.5 - 16.5 g/dL    Hematocrit 45.1 37.0 - 54.0 %    MCV 87.2 80.0 - 99.9 fL    MCH 29.4 26.0 - 35.0 pg    MCHC 33.7 32.0 - 34.5 %    RDW 12.1 11.5 - 15.0 fL    Platelets 173 092 - 067 E9/L    MPV 8.4 7.0 - 12.0 fL    Neutrophils % 57.5 43.0 - 80.0 %    Immature Granulocytes % 0.2 0.0 - 5.0 %    Lymphocytes % 35.6 20.0 - 42.0 %    Monocytes % 5.9 2.0 - 12.0 %    Eosinophils % 0.4 0.0 - 6.0 %    Basophils % 0.4 0.0 - 2.0 %    Neutrophils Absolute 4.76 1.80 - 7.30 E9/L    Immature Granulocytes # 0.02 E9/L    Lymphocytes Absolute 2.94 1.50 - 4.00 E9/L    Monocytes Absolute 0.49 0.10 - 0.95 E9/L    Eosinophils Absolute 0.03 (L) 0.05 - 0.50 E9/L    Basophils Absolute 0.03 0.00 - 0.20 E9/L   Comprehensive Metabolic Panel w/ Reflex to MG   Result Value Ref Range    Sodium 134 132 - 146 mmol/L    Potassium reflex Magnesium 4.1 3.5 - 5.0 mmol/L    Chloride 94 (L) 98 - 107 mmol/L    CO2 28 22 - 29 mmol/L    Anion Gap 12 7 - 16 mmol/L    Glucose 211 (H) 74 - 99 mg/dL    BUN 18 6 - 20 mg/dL    CREATININE 1.1 0.7 - 1.2 mg/dL    GFR Non-African American >60 >=60 mL/min/1.73    GFR African American >60     Calcium 9.8 8.6 - 10.2 mg/dL    Total Protein 8.2 6.4 - 8.3 g/dL    Albumin 4.7 3.5 - 5.2 g/dL    Total Bilirubin 0.8 0.0 - 1.2 mg/dL    Alkaline Phosphatase 90 40 - 129 U/L    ALT 15 0 - 40 U/L    AST 16 0 - 39 U/L   Lipase   Result Value Ref Range    Lipase 112 (H) 13 - 60 U/L   Urinalysis, reflex to microscopic   Result Value Ref Range    Color, UA Yellow Straw/Yellow    Clarity, UA Clear Clear    Glucose, Ur Negative Negative mg/dL    Bilirubin Urine Negative Negative    Ketones, Urine 15 (A) Negative mg/dL    Specific Gravity, UA 1.015 1.005 - 1.030    Blood, Urine Negative Negative    pH, UA 6.5 5.0 - 9.0    Protein, UA TRACE Negative mg/dL    Urobilinogen, Urine 0.2 <2.0 E.U./dL    Nitrite, Urine Negative Negative    Leukocyte Esterase, Urine Negative Negative   URINE DRUG SCREEN   Result Value Ref Range    Amphetamine Screen, Urine NOT DETECTED Negative <1000 ng/mL    Barbiturate Screen, Ur NOT DETECTED Negative < 200 ng/mL    Benzodiazepine Screen, Urine NOT DETECTED Negative < 200 ng/mL    Cannabinoid Scrn, Ur POSITIVE (A) Negative < 50ng/mL    Cocaine Metabolite Screen, Urine NOT DETECTED Negative < 300 ng/mL    Opiate Scrn, Ur NOT DETECTED Negative < 300ng/mL    PCP Screen, Urine NOT DETECTED Negative < 25 ng/mL    Methadone Screen, Urine NOT DETECTED Negative <300 ng/mL    Oxycodone Urine NOT DETECTED Negative <100 ng/mL    FENTANYL SCREEN, URINE NOT DETECTED Negative <1 ng/mL    Drug Screen Comment: see below    Microscopic Urinalysis   Result Value Ref Range    Hyaline Casts, UA 0-2 0 - 2 /LPF    WBC, UA 1-3 0 - 5 /HPF    RBC, UA 0-1 0 - 2 /HPF    Bacteria, UA NONE SEEN None Seen /HPF   POCT Glucose   Result Value Ref Range    Meter Glucose 179 (H) 74 - 99 mg/dL   POCT Glucose   Result Value Ref Range    Glucose 179 mg/dL    QC OK? yes    EKG 12 Lead   Result Value Ref Range    Ventricular Rate 62 BPM    Atrial Rate 62 BPM    P-R Interval 140 ms    QRS Duration 82 ms    Q-T Interval 430 ms    QTc Calculation (Bazett) 436 ms    R Axis 57 degrees    T Axis 51 degrees   EKG 12 Lead   Result Value Ref Range    Ventricular Rate 63 BPM    Atrial Rate 63 BPM    P-R Interval 136 ms    QRS Duration 80 ms    Q-T Interval 410 ms    QTc Calculation (Bazett) 419 ms    R Axis 23 degrees    T Axis 24 degrees       Radiology:  CT ABDOMEN PELVIS W IV CONTRAST Additional Contrast? None   Final Result   1. Hepatic steatosis. There is no hepatic mass. 2. There are no findings of inflammatory bowel disease, acute cholecystitis   or appendicitis.   Bilateral 2 cm right and left renal cyst.   3. Small right inguinal hernia containing mesenteric fat and minimal amount   of fluid. XR CHEST PORTABLE   Final Result   No acute process. ------------------------- NURSING NOTES AND VITALS REVIEWED ---------------------------  Date / Time Roomed:  7/4/2021  9:35 AM  ED Bed Assignment:  12/12    The nursing notes within the ED encounter and vital signs as below have been reviewed. /71   Pulse 60   Temp 97.7 °F (36.5 °C) (Oral)   Resp 16   Ht 5' 9\" (1.753 m)   SpO2 100%   BMI 23.63 kg/m²   Oxygen Saturation Interpretation: Normal      ------------------------------------------ PROGRESS NOTES ------------------------------------------  1:00 PM EDT  I have spoken with the patient and discussed todays results, in addition to providing specific details for the plan of care and counseling regarding the diagnosis and prognosis. Their questions are answered at this time and they are agreeable with the plan. I discussed at length with them reasons for immediate return here for re evaluation. They will followup with their primary care physician by calling their office tomorrow. --------------------------------- ADDITIONAL PROVIDER NOTES ---------------------------------  At this time the patient is without objective evidence of an acute process requiring hospitalization or inpatient management. They have remained hemodynamically stable throughout their entire ED visit and are stable for discharge with outpatient follow-up. The plan has been discussed in detail and they are aware of the specific conditions for emergent return, as well as the importance of follow-up. New Prescriptions    FAMOTIDINE (PEPCID) 20 MG TABLET    Take 1 tablet by mouth 2 times daily    ONDANSETRON (ZOFRAN) 4 MG TABLET    Take 1 tablet by mouth 3 times daily as needed for Nausea or Vomiting       Diagnosis:  1.  Non-intractable vomiting with nausea, unspecified vomiting type Disposition:  Patient's disposition: Discharge to home  Patient's condition is stable.        Patt Alba DO  Resident  07/04/21 1943

## 2022-04-13 ENCOUNTER — HOSPITAL ENCOUNTER (EMERGENCY)
Age: 47
Discharge: HOME | End: 2022-04-13
Attending: EMERGENCY MEDICINE
Payer: COMMERCIAL

## 2022-04-13 ENCOUNTER — APPOINTMENT (OUTPATIENT)
Dept: GENERAL RADIOLOGY | Age: 47
End: 2022-04-13
Payer: COMMERCIAL

## 2022-04-13 VITALS
RESPIRATION RATE: 16 BRPM | WEIGHT: 190 LBS | HEIGHT: 69 IN | HEART RATE: 106 BPM | OXYGEN SATURATION: 100 % | TEMPERATURE: 98.2 F | BODY MASS INDEX: 28.14 KG/M2 | SYSTOLIC BLOOD PRESSURE: 149 MMHG | DIASTOLIC BLOOD PRESSURE: 95 MMHG

## 2022-04-13 DIAGNOSIS — E86.0 MILD DEHYDRATION: ICD-10-CM

## 2022-04-13 DIAGNOSIS — R11.2 NON-INTRACTABLE VOMITING WITH NAUSEA, UNSPECIFIED VOMITING TYPE: ICD-10-CM

## 2022-04-13 DIAGNOSIS — J10.1 INFLUENZA A: Primary | ICD-10-CM

## 2022-04-13 LAB
ALBUMIN SERPL-MCNC: 4.4 G/DL (ref 3.5–5.2)
ALP BLD-CCNC: 107 U/L (ref 40–129)
ALT SERPL-CCNC: 14 U/L (ref 0–40)
ANION GAP SERPL CALCULATED.3IONS-SCNC: 17 MMOL/L (ref 7–16)
AST SERPL-CCNC: 12 U/L (ref 0–39)
BACTERIA: ABNORMAL /HPF
BASOPHILS ABSOLUTE: 0.01 E9/L (ref 0–0.2)
BASOPHILS RELATIVE PERCENT: 0.1 % (ref 0–2)
BETA-HYDROXYBUTYRATE: 1.3 MMOL/L (ref 0.02–0.27)
BILIRUB SERPL-MCNC: 0.6 MG/DL (ref 0–1.2)
BILIRUBIN URINE: NEGATIVE
BLOOD, URINE: NEGATIVE
BUN BLDV-MCNC: 31 MG/DL (ref 6–20)
CALCIUM SERPL-MCNC: 9.6 MG/DL (ref 8.6–10.2)
CHLORIDE BLD-SCNC: 88 MMOL/L (ref 98–107)
CLARITY: CLEAR
CO2: 27 MMOL/L (ref 22–29)
COLOR: YELLOW
CREAT SERPL-MCNC: 1.3 MG/DL (ref 0.7–1.2)
EOSINOPHILS ABSOLUTE: 0 E9/L (ref 0.05–0.5)
EOSINOPHILS RELATIVE PERCENT: 0 % (ref 0–6)
GFR AFRICAN AMERICAN: >60
GFR NON-AFRICAN AMERICAN: 59 ML/MIN/1.73
GLUCOSE BLD-MCNC: 327 MG/DL (ref 74–99)
GLUCOSE URINE: >=1000 MG/DL
HCT VFR BLD CALC: 45.9 % (ref 37–54)
HEMOGLOBIN: 16.1 G/DL (ref 12.5–16.5)
IMMATURE GRANULOCYTES #: 0.03 E9/L
IMMATURE GRANULOCYTES %: 0.3 % (ref 0–5)
INFLUENZA A BY PCR: DETECTED
INFLUENZA B BY PCR: NOT DETECTED
KETONES, URINE: >=80 MG/DL
LACTIC ACID, SEPSIS: 3.2 MMOL/L (ref 0.5–1.9)
LEUKOCYTE ESTERASE, URINE: NEGATIVE
LIPASE: 179 U/L (ref 13–60)
LYMPHOCYTES ABSOLUTE: 2.34 E9/L (ref 1.5–4)
LYMPHOCYTES RELATIVE PERCENT: 20.9 % (ref 20–42)
MCH RBC QN AUTO: 29.6 PG (ref 26–35)
MCHC RBC AUTO-ENTMCNC: 35.1 % (ref 32–34.5)
MCV RBC AUTO: 84.4 FL (ref 80–99.9)
MONOCYTES ABSOLUTE: 0.83 E9/L (ref 0.1–0.95)
MONOCYTES RELATIVE PERCENT: 7.4 % (ref 2–12)
NEUTROPHILS ABSOLUTE: 7.98 E9/L (ref 1.8–7.3)
NEUTROPHILS RELATIVE PERCENT: 71.3 % (ref 43–80)
NITRITE, URINE: NEGATIVE
PDW BLD-RTO: 11.9 FL (ref 11.5–15)
PH UA: 8 (ref 5–9)
PH VENOUS: 7.53 (ref 7.35–7.45)
PLATELET # BLD: 316 E9/L (ref 130–450)
PMV BLD AUTO: 8.7 FL (ref 7–12)
POTASSIUM REFLEX MAGNESIUM: 4 MMOL/L (ref 3.5–5)
PROTEIN UA: 100 MG/DL
RBC # BLD: 5.44 E12/L (ref 3.8–5.8)
RBC UA: ABNORMAL /HPF (ref 0–2)
SARS-COV-2, NAAT: NOT DETECTED
SODIUM BLD-SCNC: 132 MMOL/L (ref 132–146)
SPECIFIC GRAVITY UA: 1.01 (ref 1–1.03)
TOTAL PROTEIN: 8.3 G/DL (ref 6.4–8.3)
UROBILINOGEN, URINE: 0.2 E.U./DL
WBC # BLD: 11.2 E9/L (ref 4.5–11.5)
WBC UA: ABNORMAL /HPF (ref 0–5)

## 2022-04-13 PROCEDURE — 96361 HYDRATE IV INFUSION ADD-ON: CPT

## 2022-04-13 PROCEDURE — 82800 BLOOD PH: CPT

## 2022-04-13 PROCEDURE — 85025 COMPLETE CBC W/AUTO DIFF WBC: CPT

## 2022-04-13 PROCEDURE — 96375 TX/PRO/DX INJ NEW DRUG ADDON: CPT

## 2022-04-13 PROCEDURE — 80053 COMPREHEN METABOLIC PANEL: CPT

## 2022-04-13 PROCEDURE — 83605 ASSAY OF LACTIC ACID: CPT

## 2022-04-13 PROCEDURE — 6360000002 HC RX W HCPCS: Performed by: STUDENT IN AN ORGANIZED HEALTH CARE EDUCATION/TRAINING PROGRAM

## 2022-04-13 PROCEDURE — 87635 SARS-COV-2 COVID-19 AMP PRB: CPT

## 2022-04-13 PROCEDURE — 81001 URINALYSIS AUTO W/SCOPE: CPT

## 2022-04-13 PROCEDURE — 83690 ASSAY OF LIPASE: CPT

## 2022-04-13 PROCEDURE — 96376 TX/PRO/DX INJ SAME DRUG ADON: CPT

## 2022-04-13 PROCEDURE — 2580000003 HC RX 258: Performed by: STUDENT IN AN ORGANIZED HEALTH CARE EDUCATION/TRAINING PROGRAM

## 2022-04-13 PROCEDURE — 96374 THER/PROPH/DIAG INJ IV PUSH: CPT

## 2022-04-13 PROCEDURE — 6360000002 HC RX W HCPCS: Performed by: EMERGENCY MEDICINE

## 2022-04-13 PROCEDURE — 99283 EMERGENCY DEPT VISIT LOW MDM: CPT

## 2022-04-13 PROCEDURE — 71045 X-RAY EXAM CHEST 1 VIEW: CPT

## 2022-04-13 PROCEDURE — A4216 STERILE WATER/SALINE, 10 ML: HCPCS | Performed by: STUDENT IN AN ORGANIZED HEALTH CARE EDUCATION/TRAINING PROGRAM

## 2022-04-13 PROCEDURE — 93005 ELECTROCARDIOGRAM TRACING: CPT | Performed by: STUDENT IN AN ORGANIZED HEALTH CARE EDUCATION/TRAINING PROGRAM

## 2022-04-13 PROCEDURE — 2500000003 HC RX 250 WO HCPCS: Performed by: STUDENT IN AN ORGANIZED HEALTH CARE EDUCATION/TRAINING PROGRAM

## 2022-04-13 PROCEDURE — 87502 INFLUENZA DNA AMP PROBE: CPT

## 2022-04-13 PROCEDURE — 82010 KETONE BODYS QUAN: CPT

## 2022-04-13 RX ORDER — 0.9 % SODIUM CHLORIDE 0.9 %
1000 INTRAVENOUS SOLUTION INTRAVENOUS ONCE
Status: COMPLETED | OUTPATIENT
Start: 2022-04-13 | End: 2022-04-13

## 2022-04-13 RX ORDER — DIPHENHYDRAMINE HYDROCHLORIDE 50 MG/ML
12.5 INJECTION INTRAMUSCULAR; INTRAVENOUS ONCE
Status: COMPLETED | OUTPATIENT
Start: 2022-04-13 | End: 2022-04-13

## 2022-04-13 RX ORDER — IBUPROFEN 800 MG/1
800 TABLET ORAL EVERY 8 HOURS PRN
Qty: 21 TABLET | Refills: 0 | Status: SHIPPED | OUTPATIENT
Start: 2022-04-13 | End: 2022-04-20

## 2022-04-13 RX ORDER — MORPHINE SULFATE 4 MG/ML
4 INJECTION, SOLUTION INTRAMUSCULAR; INTRAVENOUS ONCE
Status: COMPLETED | OUTPATIENT
Start: 2022-04-13 | End: 2022-04-13

## 2022-04-13 RX ORDER — METOCLOPRAMIDE 10 MG/1
10 TABLET ORAL 3 TIMES DAILY PRN
Qty: 15 TABLET | Refills: 0 | Status: SHIPPED | OUTPATIENT
Start: 2022-04-13 | End: 2022-06-23

## 2022-04-13 RX ORDER — METOCLOPRAMIDE HYDROCHLORIDE 5 MG/ML
10 INJECTION INTRAMUSCULAR; INTRAVENOUS ONCE
Status: COMPLETED | OUTPATIENT
Start: 2022-04-13 | End: 2022-04-13

## 2022-04-13 RX ORDER — DIPHENHYDRAMINE HYDROCHLORIDE 50 MG/ML
25 INJECTION INTRAMUSCULAR; INTRAVENOUS ONCE
Status: COMPLETED | OUTPATIENT
Start: 2022-04-13 | End: 2022-04-13

## 2022-04-13 RX ORDER — METOCLOPRAMIDE 10 MG/1
10 TABLET ORAL 4 TIMES DAILY PRN
Qty: 20 TABLET | Refills: 0 | Status: SHIPPED | OUTPATIENT
Start: 2022-04-13 | End: 2022-06-23

## 2022-04-13 RX ORDER — ONDANSETRON 4 MG/1
4 TABLET, ORALLY DISINTEGRATING ORAL EVERY 8 HOURS PRN
Qty: 10 TABLET | Refills: 0 | Status: ON HOLD | OUTPATIENT
Start: 2022-04-13 | End: 2022-06-24

## 2022-04-13 RX ORDER — DIPHENHYDRAMINE HCL 25 MG
25 CAPSULE ORAL EVERY 8 HOURS PRN
Qty: 15 CAPSULE | Refills: 0 | Status: SHIPPED | OUTPATIENT
Start: 2022-04-13

## 2022-04-13 RX ADMIN — FAMOTIDINE 20 MG: 10 INJECTION INTRAVENOUS at 08:26

## 2022-04-13 RX ADMIN — DIPHENHYDRAMINE HYDROCHLORIDE 12.5 MG: 50 INJECTION, SOLUTION INTRAMUSCULAR; INTRAVENOUS at 08:27

## 2022-04-13 RX ADMIN — MORPHINE SULFATE 4 MG: 4 INJECTION, SOLUTION INTRAMUSCULAR; INTRAVENOUS at 09:14

## 2022-04-13 RX ADMIN — DIPHENHYDRAMINE HYDROCHLORIDE 25 MG: 50 INJECTION, SOLUTION INTRAMUSCULAR; INTRAVENOUS at 09:14

## 2022-04-13 RX ADMIN — SODIUM CHLORIDE 1000 ML: 9 INJECTION, SOLUTION INTRAVENOUS at 08:19

## 2022-04-13 RX ADMIN — SODIUM CHLORIDE 1000 ML: 9 INJECTION, SOLUTION INTRAVENOUS at 09:18

## 2022-04-13 RX ADMIN — METOCLOPRAMIDE 10 MG: 5 INJECTION, SOLUTION INTRAMUSCULAR; INTRAVENOUS at 08:27

## 2022-04-13 ASSESSMENT — PAIN SCALES - GENERAL: PAINLEVEL_OUTOF10: 2

## 2022-04-13 NOTE — ED PROVIDER NOTES
All other systems reviewed and are negative. Physical Exam  Vitals and nursing note reviewed. Constitutional:       General: He is not in acute distress. Appearance: He is ill-appearing. He is not toxic-appearing. HENT:      Head: Normocephalic and atraumatic. Right Ear: External ear normal.      Left Ear: External ear normal.      Nose: Nose normal. No rhinorrhea. Mouth/Throat:      Mouth: Mucous membranes are dry. Pharynx: Oropharynx is clear. Eyes:      Extraocular Movements: Extraocular movements intact. Conjunctiva/sclera: Conjunctivae normal.      Pupils: Pupils are equal, round, and reactive to light. Cardiovascular:      Rate and Rhythm: Normal rate and regular rhythm. Pulses: Normal pulses. Heart sounds: Normal heart sounds. Pulmonary:      Effort: Pulmonary effort is normal. No respiratory distress. Breath sounds: Normal breath sounds. No wheezing or rales. Abdominal:      General: Bowel sounds are normal.      Palpations: Abdomen is soft. Tenderness: There is no abdominal tenderness. There is no right CVA tenderness, left CVA tenderness, guarding or rebound. Musculoskeletal:         General: No tenderness. Normal range of motion. Cervical back: Normal range of motion and neck supple. Right lower leg: No edema. Left lower leg: No edema. Skin:     General: Skin is warm and dry. Capillary Refill: Capillary refill takes less than 2 seconds. Coloration: Skin is not jaundiced or pale. Findings: No rash. Neurological:      General: No focal deficit present. Mental Status: He is alert and oriented to person, place, and time. Sensory: No sensory deficit. Motor: No weakness. Psychiatric:         Mood and Affect: Mood normal.         Behavior: Behavior normal.          Procedures       MDM     This is a 54-year-old male presenting for evaluation of nausea and vomiting x3 days.   On arrival he is alert and oriented, he appears uncomfortable due to symptoms. He is not actively vomiting at this time but states he feels very nauseous. Abdomen is soft and nontender without rebound or guarding and initially patient is denying any abdominal pain. Later during this encounter he did say he was having abdominal discomfort so he was treated with IV morphine with improvement. Vitals are stable. Patient is afebrile. Mouth appears dry. Lungs CTA bilaterally. He was treated with Reglan with improvement of symptoms. Labs obtained, show lactic acid 3.2, beta hydroxybutyrate 1.3, lipase 179, venous pH 7.53. Slight KHAI with creatinine 1.3. Patient was treated with x2 IV fluid boluses. Suspect these findings are likely related to patient's continuous vomiting for 3 days. Labs are significant for positive influenza A swab. CXR shows no acute process. On repeat examination patient's symptoms are significantly improved. Abdomen remains soft and nontender. His vitals remained stable and he is nontoxic in appearance. Given these findings, feel patient is stable and appropriate for discharge. Suspect findings are related to fluid losses from persistent nausea and vomiting related to influenza A diagnosis. Discussed these results with the patient, he voiced understanding. He is amenable to plan for discharge. He will be provided prescriptions for ibuprofen, Benadryl, Reglan, and Zofran. Strict return precautions were discussed. ED Course as of 04/14/22 1303   Wed Apr 13, 2022   9921 Patient now stating that he is having some abdominal pain; 4 mg IV morphine was ordered. [VG]      ED Course User Index  [VG] Lara Suero DO         EKG reviewed and interpreted by me:  Normal sinus rhythm, nonspecific T wave abnormalities, no ST elevations, normal axis. Changes as compared to previous EKG from July 2021. Vent.  rate 70 BPM  IN interval 134 ms  QRS duration 84 ms  QT/QTc 404/436 ms        I have discussed this patient with my attending, who has seen the patient and agrees with this disposition. Patient was seen and evaluated by myself and my attending Rylie Dumont DO. Assessment and Plan discussed with attending provider, please see attestation for final plan of care.       --------------------------------------------- PAST HISTORY ---------------------------------------------  Past Medical History:  has a past medical history of Anxiety, HLD (hyperlipidemia), HTN (hypertension), OCD (obsessive compulsive disorder), and Type 2 diabetes mellitus ([facility] Past Surgical History:  has a past surgical history that includes Wrist surgery (Right); Inguinal hernia repair; and Foot Tendon Surgery (Right). Social History:  reports that he has never smoked. His smokeless tobacco use includes chew. He reports current drug use. Drug: Marijuana Julia Aver). He reports that he does not drink alcohol. Family History: family history includes Diabetes in his father. The patients home medications have been reviewed. Allergies: Patient has no known allergies.     -------------------------------------------------- RESULTS -------------------------------------------------  Labs:  Results for orders placed or performed during the hospital encounter of 04/13/22   COVID-19, Rapid    Specimen: Nasopharyngeal Swab   Result Value Ref Range    SARS-CoV-2, NAAT Not Detected Not Detected   Rapid influenza A/B antigens    Specimen: Nasopharyngeal   Result Value Ref Range    Influenza A by PCR DETECTED (A) Not Detected    Influenza B by PCR Not Detected Not Detected   CBC with Auto Differential   Result Value Ref Range    WBC 11.2 4.5 - 11.5 E9/L    RBC 5.44 3.80 - 5.80 E12/L    Hemoglobin 16.1 12.5 - 16.5 g/dL    Hematocrit 45.9 37.0 - 54.0 %    MCV 84.4 80.0 - 99.9 fL    MCH 29.6 26.0 - 35.0 pg    MCHC 35.1 (H) 32.0 - 34.5 %    RDW 11.9 11.5 - 15.0 fL    Platelets 410 683 - 097 E9/L    MPV 8.7 7.0 - 12.0 fL    Neutrophils % 71.3 43.0 - 80.0 %    Immature Granulocytes % 0.3 0.0 - 5.0 %    Lymphocytes % 20.9 20.0 - 42.0 %    Monocytes % 7.4 2.0 - 12.0 %    Eosinophils % 0.0 0.0 - 6.0 %    Basophils % 0.1 0.0 - 2.0 %    Neutrophils Absolute 7.98 (H) 1.80 - 7.30 E9/L    Immature Granulocytes # 0.03 E9/L    Lymphocytes Absolute 2.34 1.50 - 4.00 E9/L    Monocytes Absolute 0.83 0.10 - 0.95 E9/L    Eosinophils Absolute 0.00 (L) 0.05 - 0.50 E9/L    Basophils Absolute 0.01 0.00 - 0.20 E9/L   Comprehensive Metabolic Panel w/ Reflex to MG   Result Value Ref Range    Sodium 132 132 - 146 mmol/L    Potassium reflex Magnesium 4.0 3.5 - 5.0 mmol/L    Chloride 88 (L) 98 - 107 mmol/L    CO2 27 22 - 29 mmol/L    Anion Gap 17 (H) 7 - 16 mmol/L    Glucose 327 (H) 74 - 99 mg/dL    BUN 31 (H) 6 - 20 mg/dL    CREATININE 1.3 (H) 0.7 - 1.2 mg/dL    GFR Non-African American 59 >=60 mL/min/1.73    GFR African American >60     Calcium 9.6 8.6 - 10.2 mg/dL    Total Protein 8.3 6.4 - 8.3 g/dL    Albumin 4.4 3.5 - 5.2 g/dL    Total Bilirubin 0.6 0.0 - 1.2 mg/dL    Alkaline Phosphatase 107 40 - 129 U/L    ALT 14 0 - 40 U/L    AST 12 0 - 39 U/L   Lipase   Result Value Ref Range    Lipase 179 (H) 13 - 60 U/L   Urinalysis with Microscopic   Result Value Ref Range    Color, UA Yellow Straw/Yellow    Clarity, UA Clear Clear    Glucose, Ur >=1000 (A) Negative mg/dL    Bilirubin Urine Negative Negative    Ketones, Urine >=80 (A) Negative mg/dL    Specific Gravity, UA 1.015 1.005 - 1.030    Blood, Urine Negative Negative    pH, UA 8.0 5.0 - 9.0    Protein,  (A) Negative mg/dL    Urobilinogen, Urine 0.2 <2.0 E.U./dL    Nitrite, Urine Negative Negative    Leukocyte Esterase, Urine Negative Negative    WBC, UA NONE 0 - 5 /HPF    RBC, UA NONE 0 - 2 /HPF    Bacteria, UA NONE SEEN None Seen /HPF   Lactate, Sepsis   Result Value Ref Range    Lactic Acid, Sepsis 3.2 (H) 0.5 - 1.9 mmol/L   PH, VENOUS   Result Value Ref Range    pH, Rony 7.53 (H) 7.35 - 7.45   Beta-Hydroxybutyrate Result Value Ref Range    Beta-Hydroxybutyrate 1.30 (H) 0.02 - 0.27 mmol/L   EKG 12 Lead   Result Value Ref Range    Ventricular Rate 70 BPM    Atrial Rate 70 BPM    P-R Interval 134 ms    QRS Duration 84 ms    Q-T Interval 404 ms    QTc Calculation (Bazett) 436 ms    P Axis 63 degrees    R Axis 6 degrees    T Axis -23 degrees       Radiology:  XR CHEST PORTABLE   Final Result   No acute process. ------------------------- NURSING NOTES AND VITALS REVIEWED ---------------------------  Date / Time Roomed:  4/13/2022  7:06 AM  ED Bed Assignment:  18/18    The nursing notes within the ED encounter and vital signs as below have been reviewed. BP (!) 149/95   Pulse 106   Temp 98.2 °F (36.8 °C) (Temporal)   Resp 16   Ht 5' 9\" (1.753 m)   Wt 190 lb (86.2 kg)   SpO2 100%   BMI 28.06 kg/m²   Oxygen Saturation Interpretation: Normal      ------------------------------------------ PROGRESS NOTES ------------------------------------------  1:10 PM EDT  I have spoken with the patient and discussed todays results, in addition to providing specific details for the plan of care and counseling regarding the diagnosis and prognosis. Their questions are answered at this time and they are agreeable with the plan. I discussed at length with them reasons for immediate return here for re evaluation. They will followup with their primary care physician by calling their office tomorrow. --------------------------------- ADDITIONAL PROVIDER NOTES ---------------------------------  At this time the patient is without objective evidence of an acute process requiring hospitalization or inpatient management. They have remained hemodynamically stable throughout their entire ED visit and are stable for discharge with outpatient follow-up. The plan has been discussed in detail and they are aware of the specific conditions for emergent return, as well as the importance of follow-up.       Discharge Medication List as of 4/13/2022 11:51 AM      START taking these medications    Details   !! metoclopramide (REGLAN) 10 MG tablet Take 1 tablet by mouth 3 times daily as needed (nausea, vomiting) Take with 1 dose of prescribed BENADRYL (diphenhydramine). , Disp-15 tablet, R-0Print      diphenhydrAMINE (BENADRYL ALLERGY) 25 MG capsule Take 1 capsule by mouth every 8 hours as needed (nausea, vomiting, TO TAKE WITH REGLAN) Take with 1 dose of prescribed REGLAN (metoclopramide). , Disp-15 capsule, R-0Print      ondansetron (ZOFRAN ODT) 4 MG disintegrating tablet Take 1 tablet by mouth every 8 hours as needed for Nausea or Vomiting May substitute for covered product, Disp-10 tablet, R-0Print      !! metoclopramide (REGLAN) 10 MG tablet Take 1 tablet by mouth 4 times daily as needed (Nausea or vomiting, ensure you take 1 at night before bed), Disp-20 tablet, R-0Print      ibuprofen (IBU) 800 MG tablet Take 1 tablet by mouth every 8 hours as needed for Pain, Disp-21 tablet, R-0Print       !! - Potential duplicate medications found. Please discuss with provider. Diagnosis:  1. Influenza A    2. Non-intractable vomiting with nausea, unspecified vomiting type    3. Mild dehydration        Disposition:  Patient's disposition: Discharge to home  Patient's condition is stable.        Rebeca Beckham DO  Resident  04/14/22 [id number]

## 2022-04-14 LAB
EKG ATRIAL RATE: 70 BPM
EKG P AXIS: 63 DEGREES
EKG P-R INTERVAL: 134 MS
EKG Q-T INTERVAL: 404 MS
EKG QRS DURATION: 84 MS
EKG QTC CALCULATION (BAZETT): 436 MS
EKG R AXIS: 6 DEGREES
EKG T AXIS: -23 DEGREES
EKG VENTRICULAR RATE: 70 BPM

## 2022-04-14 ASSESSMENT — ENCOUNTER SYMPTOMS
BACK PAIN: 0
RHINORRHEA: 0
SORE THROAT: 0
NAUSEA: 1
SHORTNESS OF BREATH: 0
DIARRHEA: 0
ABDOMINAL PAIN: 0
COUGH: 0
VOMITING: 1

## 2022-06-23 ENCOUNTER — APPOINTMENT (OUTPATIENT)
Dept: CT IMAGING | Age: 47
DRG: 074 | End: 2022-06-23
Payer: COMMERCIAL

## 2022-06-23 ENCOUNTER — APPOINTMENT (OUTPATIENT)
Dept: ULTRASOUND IMAGING | Age: 47
DRG: 074 | End: 2022-06-23
Payer: COMMERCIAL

## 2022-06-23 ENCOUNTER — HOSPITAL ENCOUNTER (INPATIENT)
Age: 47
LOS: 3 days | Discharge: HOME | DRG: 074 | End: 2022-06-26
Attending: EMERGENCY MEDICINE | Admitting: STUDENT IN AN ORGANIZED HEALTH CARE EDUCATION/TRAINING PROGRAM
Payer: COMMERCIAL

## 2022-06-23 DIAGNOSIS — R11.2 NON-INTRACTABLE VOMITING WITH NAUSEA, UNSPECIFIED VOMITING TYPE: ICD-10-CM

## 2022-06-23 DIAGNOSIS — E86.0 DEHYDRATION: ICD-10-CM

## 2022-06-23 DIAGNOSIS — I95.89 HYPOTENSION DUE TO HYPOVOLEMIA: ICD-10-CM

## 2022-06-23 DIAGNOSIS — E86.1 HYPOTENSION DUE TO HYPOVOLEMIA: ICD-10-CM

## 2022-06-23 DIAGNOSIS — N17.9 AKI (ACUTE KIDNEY INJURY) (HCC): Primary | ICD-10-CM

## 2022-06-23 PROBLEM — E11.9 DM (DIABETES MELLITUS), TYPE 2 (HCC): Status: ACTIVE | Noted: 2022-06-23

## 2022-06-23 PROBLEM — I24.89 DEMAND ISCHEMIA: Status: ACTIVE | Noted: 2022-06-23

## 2022-06-23 PROBLEM — I24.8 DEMAND ISCHEMIA (HCC): Status: ACTIVE | Noted: 2022-06-23

## 2022-06-23 PROBLEM — E78.5 HYPERLIPIDEMIA: Status: ACTIVE | Noted: 2022-06-23

## 2022-06-23 PROBLEM — I10 BENIGN ESSENTIAL HTN: Status: ACTIVE | Noted: 2022-06-23

## 2022-06-23 LAB
ADENOVIRUS BY PCR: NOT DETECTED
ALBUMIN SERPL-MCNC: 4.1 G/DL (ref 3.5–5.2)
ALP BLD-CCNC: 84 U/L (ref 40–129)
ALT SERPL-CCNC: 11 U/L (ref 0–40)
AMPHETAMINE SCREEN, URINE: NOT DETECTED
ANION GAP SERPL CALCULATED.3IONS-SCNC: 14 MMOL/L (ref 7–16)
AST SERPL-CCNC: 16 U/L (ref 0–39)
BARBITURATE SCREEN URINE: NOT DETECTED
BASOPHILS ABSOLUTE: 0.05 E9/L (ref 0–0.2)
BASOPHILS RELATIVE PERCENT: 0.5 % (ref 0–2)
BENZODIAZEPINE SCREEN, URINE: NOT DETECTED
BETA-HYDROXYBUTYRATE: 0.23 MMOL/L (ref 0.02–0.27)
BILIRUB SERPL-MCNC: 0.5 MG/DL (ref 0–1.2)
BILIRUBIN URINE: NEGATIVE
BLOOD, URINE: NEGATIVE
BORDETELLA PARAPERTUSSIS BY PCR: NOT DETECTED
BORDETELLA PERTUSSIS BY PCR: NOT DETECTED
BUN BLDV-MCNC: 86 MG/DL (ref 6–20)
CALCIUM SERPL-MCNC: 9.3 MG/DL (ref 8.6–10.2)
CANNABINOID SCREEN URINE: POSITIVE
CHLAMYDOPHILIA PNEUMONIAE BY PCR: NOT DETECTED
CHLORIDE BLD-SCNC: 83 MMOL/L (ref 98–107)
CLARITY: CLEAR
CO2: 33 MMOL/L (ref 22–29)
COCAINE METABOLITE SCREEN URINE: NOT DETECTED
COLOR: YELLOW
CORONAVIRUS 229E BY PCR: NOT DETECTED
CORONAVIRUS HKU1 BY PCR: NOT DETECTED
CORONAVIRUS NL63 BY PCR: NOT DETECTED
CORONAVIRUS OC43 BY PCR: NOT DETECTED
CREAT SERPL-MCNC: 3.7 MG/DL (ref 0.7–1.2)
CREATININE URINE: 112 MG/DL (ref 40–278)
EKG ATRIAL RATE: 61 BPM
EKG P AXIS: 70 DEGREES
EKG P-R INTERVAL: 148 MS
EKG Q-T INTERVAL: 446 MS
EKG QRS DURATION: 78 MS
EKG QTC CALCULATION (BAZETT): 448 MS
EKG R AXIS: 16 DEGREES
EKG T AXIS: -14 DEGREES
EKG VENTRICULAR RATE: 61 BPM
EOSINOPHILS ABSOLUTE: 0.02 E9/L (ref 0.05–0.5)
EOSINOPHILS RELATIVE PERCENT: 0.2 % (ref 0–6)
ETHANOL: <10 MG/DL (ref 0–0.08)
FENTANYL SCREEN, URINE: NOT DETECTED
GFR AFRICAN AMERICAN: 21
GFR NON-AFRICAN AMERICAN: 18 ML/MIN/1.73
GLUCOSE BLD-MCNC: 160 MG/DL
GLUCOSE BLD-MCNC: 160 MG/DL (ref 74–99)
GLUCOSE URINE: NEGATIVE MG/DL
HBA1C MFR BLD: 7.9 % (ref 4–5.6)
HCT VFR BLD CALC: 41.5 % (ref 37–54)
HEMOGLOBIN: 14 G/DL (ref 12.5–16.5)
HUMAN METAPNEUMOVIRUS BY PCR: NOT DETECTED
HUMAN RHINOVIRUS/ENTEROVIRUS BY PCR: NOT DETECTED
IMMATURE GRANULOCYTES #: 0.04 E9/L
IMMATURE GRANULOCYTES %: 0.4 % (ref 0–5)
INFLUENZA A BY PCR: NOT DETECTED
INFLUENZA B BY PCR: NOT DETECTED
KETONES, URINE: NEGATIVE MG/DL
LACTIC ACID, SEPSIS: 1.3 MMOL/L (ref 0.5–1.9)
LEUKOCYTE ESTERASE, URINE: NEGATIVE
LIPASE: 84 U/L (ref 13–60)
LYMPHOCYTES ABSOLUTE: 4.72 E9/L (ref 1.5–4)
LYMPHOCYTES RELATIVE PERCENT: 45.2 % (ref 20–42)
Lab: ABNORMAL
MCH RBC QN AUTO: 29 PG (ref 26–35)
MCHC RBC AUTO-ENTMCNC: 33.7 % (ref 32–34.5)
MCV RBC AUTO: 85.9 FL (ref 80–99.9)
METER GLUCOSE: 104 MG/DL (ref 74–99)
METER GLUCOSE: 158 MG/DL (ref 74–99)
METHADONE SCREEN, URINE: NOT DETECTED
MONOCYTES ABSOLUTE: 0.96 E9/L (ref 0.1–0.95)
MONOCYTES RELATIVE PERCENT: 9.2 % (ref 2–12)
MYCOPLASMA PNEUMONIAE BY PCR: NOT DETECTED
NEUTROPHILS ABSOLUTE: 4.66 E9/L (ref 1.8–7.3)
NEUTROPHILS RELATIVE PERCENT: 44.5 % (ref 43–80)
NITRITE, URINE: NEGATIVE
OPIATE SCREEN URINE: NOT DETECTED
OSMOLALITY: 299 MOSM/KG (ref 285–310)
OXYCODONE URINE: NOT DETECTED
PARAINFLUENZA VIRUS 1 BY PCR: NOT DETECTED
PARAINFLUENZA VIRUS 2 BY PCR: NOT DETECTED
PARAINFLUENZA VIRUS 3 BY PCR: NOT DETECTED
PARAINFLUENZA VIRUS 4 BY PCR: NOT DETECTED
PDW BLD-RTO: 11.8 FL (ref 11.5–15)
PH UA: 7.5 (ref 5–9)
PH VENOUS: 7.37 (ref 7.35–7.45)
PHENCYCLIDINE SCREEN URINE: NOT DETECTED
PLATELET # BLD: 370 E9/L (ref 130–450)
PMV BLD AUTO: 8.5 FL (ref 7–12)
POTASSIUM SERPL-SCNC: 3.7 MMOL/L (ref 3.5–5)
PROTEIN UA: NEGATIVE MG/DL
RBC # BLD: 4.83 E12/L (ref 3.8–5.8)
RESPIRATORY SYNCYTIAL VIRUS BY PCR: NOT DETECTED
SARS-COV-2, PCR: NOT DETECTED
SODIUM BLD-SCNC: 130 MMOL/L (ref 132–146)
SODIUM URINE: 87 MMOL/L
SPECIFIC GRAVITY UA: 1.01 (ref 1–1.03)
TOTAL PROTEIN: 7.4 G/DL (ref 6.4–8.3)
TROPONIN, HIGH SENSITIVITY: 35 NG/L (ref 0–11)
TROPONIN, HIGH SENSITIVITY: 53 NG/L (ref 0–11)
TSH SERPL DL<=0.05 MIU/L-ACNC: 1.34 UIU/ML (ref 0.27–4.2)
UREA NITROGEN, UR: 1113 MG/DL (ref 800–1666)
UROBILINOGEN, URINE: 0.2 E.U./DL
WBC # BLD: 10.5 E9/L (ref 4.5–11.5)

## 2022-06-23 PROCEDURE — 99285 EMERGENCY DEPT VISIT HI MDM: CPT

## 2022-06-23 PROCEDURE — 81003 URINALYSIS AUTO W/O SCOPE: CPT

## 2022-06-23 PROCEDURE — 6370000000 HC RX 637 (ALT 250 FOR IP): Performed by: STUDENT IN AN ORGANIZED HEALTH CARE EDUCATION/TRAINING PROGRAM

## 2022-06-23 PROCEDURE — 82800 BLOOD PH: CPT

## 2022-06-23 PROCEDURE — 76770 US EXAM ABDO BACK WALL COMP: CPT

## 2022-06-23 PROCEDURE — 2580000003 HC RX 258: Performed by: EMERGENCY MEDICINE

## 2022-06-23 PROCEDURE — 84300 ASSAY OF URINE SODIUM: CPT

## 2022-06-23 PROCEDURE — 36415 COLL VENOUS BLD VENIPUNCTURE: CPT

## 2022-06-23 PROCEDURE — 83930 ASSAY OF BLOOD OSMOLALITY: CPT

## 2022-06-23 PROCEDURE — 2580000003 HC RX 258: Performed by: STUDENT IN AN ORGANIZED HEALTH CARE EDUCATION/TRAINING PROGRAM

## 2022-06-23 PROCEDURE — 83036 HEMOGLOBIN GLYCOSYLATED A1C: CPT

## 2022-06-23 PROCEDURE — 83690 ASSAY OF LIPASE: CPT

## 2022-06-23 PROCEDURE — 93005 ELECTROCARDIOGRAM TRACING: CPT | Performed by: EMERGENCY MEDICINE

## 2022-06-23 PROCEDURE — 82570 ASSAY OF URINE CREATININE: CPT

## 2022-06-23 PROCEDURE — 82962 GLUCOSE BLOOD TEST: CPT

## 2022-06-23 PROCEDURE — 96375 TX/PRO/DX INJ NEW DRUG ADDON: CPT

## 2022-06-23 PROCEDURE — 96374 THER/PROPH/DIAG INJ IV PUSH: CPT

## 2022-06-23 PROCEDURE — 0202U NFCT DS 22 TRGT SARS-COV-2: CPT

## 2022-06-23 PROCEDURE — 74176 CT ABD & PELVIS W/O CONTRAST: CPT

## 2022-06-23 PROCEDURE — 96361 HYDRATE IV INFUSION ADD-ON: CPT

## 2022-06-23 PROCEDURE — 6360000002 HC RX W HCPCS: Performed by: EMERGENCY MEDICINE

## 2022-06-23 PROCEDURE — 80053 COMPREHEN METABOLIC PANEL: CPT

## 2022-06-23 PROCEDURE — 83605 ASSAY OF LACTIC ACID: CPT

## 2022-06-23 PROCEDURE — 85025 COMPLETE CBC W/AUTO DIFF WBC: CPT

## 2022-06-23 PROCEDURE — 80307 DRUG TEST PRSMV CHEM ANLYZR: CPT

## 2022-06-23 PROCEDURE — 84484 ASSAY OF TROPONIN QUANT: CPT

## 2022-06-23 PROCEDURE — 84540 ASSAY OF URINE/UREA-N: CPT

## 2022-06-23 PROCEDURE — 82077 ASSAY SPEC XCP UR&BREATH IA: CPT

## 2022-06-23 PROCEDURE — 82010 KETONE BODYS QUAN: CPT

## 2022-06-23 PROCEDURE — 1200000000 HC SEMI PRIVATE

## 2022-06-23 PROCEDURE — 6360000002 HC RX W HCPCS: Performed by: STUDENT IN AN ORGANIZED HEALTH CARE EDUCATION/TRAINING PROGRAM

## 2022-06-23 PROCEDURE — C9113 INJ PANTOPRAZOLE SODIUM, VIA: HCPCS | Performed by: EMERGENCY MEDICINE

## 2022-06-23 PROCEDURE — 84443 ASSAY THYROID STIM HORMONE: CPT

## 2022-06-23 RX ORDER — SODIUM CHLORIDE 9 MG/ML
INJECTION, SOLUTION INTRAVENOUS PRN
Status: DISCONTINUED | OUTPATIENT
Start: 2022-06-23 | End: 2022-06-26 | Stop reason: HOSPADM

## 2022-06-23 RX ORDER — ONDANSETRON 4 MG/1
4 TABLET, ORALLY DISINTEGRATING ORAL EVERY 8 HOURS PRN
Status: DISCONTINUED | OUTPATIENT
Start: 2022-06-23 | End: 2022-06-26 | Stop reason: HOSPADM

## 2022-06-23 RX ORDER — ONDANSETRON 2 MG/ML
4 INJECTION INTRAMUSCULAR; INTRAVENOUS ONCE
Status: COMPLETED | OUTPATIENT
Start: 2022-06-23 | End: 2022-06-23

## 2022-06-23 RX ORDER — LANOLIN ALCOHOL/MO/W.PET/CERES
3 CREAM (GRAM) TOPICAL NIGHTLY PRN
Status: DISCONTINUED | OUTPATIENT
Start: 2022-06-23 | End: 2022-06-26 | Stop reason: HOSPADM

## 2022-06-23 RX ORDER — ONDANSETRON 2 MG/ML
4 INJECTION INTRAMUSCULAR; INTRAVENOUS EVERY 6 HOURS PRN
Status: DISCONTINUED | OUTPATIENT
Start: 2022-06-23 | End: 2022-06-26 | Stop reason: HOSPADM

## 2022-06-23 RX ORDER — ACETAMINOPHEN 325 MG/1
650 TABLET ORAL EVERY 6 HOURS PRN
Status: DISCONTINUED | OUTPATIENT
Start: 2022-06-23 | End: 2022-06-26 | Stop reason: HOSPADM

## 2022-06-23 RX ORDER — DEXTROSE MONOHYDRATE 50 MG/ML
100 INJECTION, SOLUTION INTRAVENOUS PRN
Status: DISCONTINUED | OUTPATIENT
Start: 2022-06-23 | End: 2022-06-26 | Stop reason: HOSPADM

## 2022-06-23 RX ORDER — PROMETHAZINE HYDROCHLORIDE 25 MG/ML
6.25 INJECTION, SOLUTION INTRAMUSCULAR; INTRAVENOUS EVERY 6 HOURS PRN
Status: DISCONTINUED | OUTPATIENT
Start: 2022-06-23 | End: 2022-06-26 | Stop reason: HOSPADM

## 2022-06-23 RX ORDER — SODIUM CHLORIDE 9 MG/ML
INJECTION, SOLUTION INTRAVENOUS CONTINUOUS
Status: DISCONTINUED | OUTPATIENT
Start: 2022-06-23 | End: 2022-06-26

## 2022-06-23 RX ORDER — POTASSIUM CHLORIDE 7.45 MG/ML
10 INJECTION INTRAVENOUS PRN
Status: DISCONTINUED | OUTPATIENT
Start: 2022-06-23 | End: 2022-06-26 | Stop reason: HOSPADM

## 2022-06-23 RX ORDER — POTASSIUM CHLORIDE 20 MEQ/1
40 TABLET, EXTENDED RELEASE ORAL PRN
Status: DISCONTINUED | OUTPATIENT
Start: 2022-06-23 | End: 2022-06-26 | Stop reason: HOSPADM

## 2022-06-23 RX ORDER — SENNA PLUS 8.6 MG/1
1 TABLET ORAL DAILY PRN
Status: DISCONTINUED | OUTPATIENT
Start: 2022-06-23 | End: 2022-06-26 | Stop reason: HOSPADM

## 2022-06-23 RX ORDER — SODIUM CHLORIDE 9 MG/ML
INJECTION, SOLUTION INTRAVENOUS ONCE
Status: DISCONTINUED | OUTPATIENT
Start: 2022-06-23 | End: 2022-06-23

## 2022-06-23 RX ORDER — ACETAMINOPHEN 650 MG/1
650 SUPPOSITORY RECTAL EVERY 6 HOURS PRN
Status: DISCONTINUED | OUTPATIENT
Start: 2022-06-23 | End: 2022-06-26 | Stop reason: HOSPADM

## 2022-06-23 RX ORDER — FLUVOXAMINE MALEATE 50 MG/1
50 TABLET, COATED ORAL DAILY
Status: DISCONTINUED | OUTPATIENT
Start: 2022-06-23 | End: 2022-06-26 | Stop reason: HOSPADM

## 2022-06-23 RX ORDER — ATORVASTATIN CALCIUM 40 MG/1
40 TABLET, FILM COATED ORAL DAILY
Status: DISCONTINUED | OUTPATIENT
Start: 2022-06-23 | End: 2022-06-26 | Stop reason: HOSPADM

## 2022-06-23 RX ORDER — DIPHENHYDRAMINE HCL 25 MG
25 TABLET ORAL EVERY 8 HOURS PRN
Status: DISCONTINUED | OUTPATIENT
Start: 2022-06-23 | End: 2022-06-26 | Stop reason: HOSPADM

## 2022-06-23 RX ORDER — SODIUM CHLORIDE 0.9 % (FLUSH) 0.9 %
10 SYRINGE (ML) INJECTION PRN
Status: DISCONTINUED | OUTPATIENT
Start: 2022-06-23 | End: 2022-06-26 | Stop reason: HOSPADM

## 2022-06-23 RX ORDER — 0.9 % SODIUM CHLORIDE 0.9 %
30 INTRAVENOUS SOLUTION INTRAVENOUS ONCE
Status: COMPLETED | OUTPATIENT
Start: 2022-06-23 | End: 2022-06-23

## 2022-06-23 RX ORDER — SODIUM CHLORIDE 0.9 % (FLUSH) 0.9 %
10 SYRINGE (ML) INJECTION EVERY 12 HOURS SCHEDULED
Status: DISCONTINUED | OUTPATIENT
Start: 2022-06-23 | End: 2022-06-26 | Stop reason: HOSPADM

## 2022-06-23 RX ORDER — 0.9 % SODIUM CHLORIDE 0.9 %
1000 INTRAVENOUS SOLUTION INTRAVENOUS ONCE
Status: DISCONTINUED | OUTPATIENT
Start: 2022-06-23 | End: 2022-06-23

## 2022-06-23 RX ORDER — ENOXAPARIN SODIUM 100 MG/ML
30 INJECTION SUBCUTANEOUS DAILY
Status: DISCONTINUED | OUTPATIENT
Start: 2022-06-23 | End: 2022-06-24

## 2022-06-23 RX ORDER — INSULIN LISPRO 100 [IU]/ML
0-6 INJECTION, SOLUTION INTRAVENOUS; SUBCUTANEOUS NIGHTLY
Status: DISCONTINUED | OUTPATIENT
Start: 2022-06-23 | End: 2022-06-26 | Stop reason: HOSPADM

## 2022-06-23 RX ORDER — INSULIN LISPRO 100 [IU]/ML
0-12 INJECTION, SOLUTION INTRAVENOUS; SUBCUTANEOUS
Status: DISCONTINUED | OUTPATIENT
Start: 2022-06-23 | End: 2022-06-26 | Stop reason: HOSPADM

## 2022-06-23 RX ORDER — INSULIN GLARGINE 100 [IU]/ML
20 INJECTION, SOLUTION SUBCUTANEOUS EVERY MORNING
Status: DISCONTINUED | OUTPATIENT
Start: 2022-06-24 | End: 2022-06-26 | Stop reason: HOSPADM

## 2022-06-23 RX ORDER — PANTOPRAZOLE SODIUM 40 MG/10ML
40 INJECTION, POWDER, LYOPHILIZED, FOR SOLUTION INTRAVENOUS ONCE
Status: COMPLETED | OUTPATIENT
Start: 2022-06-23 | End: 2022-06-23

## 2022-06-23 RX ORDER — PROCHLORPERAZINE EDISYLATE 5 MG/ML
10 INJECTION INTRAMUSCULAR; INTRAVENOUS EVERY 6 HOURS PRN
Status: DISCONTINUED | OUTPATIENT
Start: 2022-06-23 | End: 2022-06-26 | Stop reason: HOSPADM

## 2022-06-23 RX ORDER — HYDROXYZINE PAMOATE 25 MG/1
25 CAPSULE ORAL 3 TIMES DAILY
Status: DISCONTINUED | OUTPATIENT
Start: 2022-06-23 | End: 2022-06-26 | Stop reason: HOSPADM

## 2022-06-23 RX ADMIN — SODIUM CHLORIDE 2448 ML: 9 INJECTION, SOLUTION INTRAVENOUS at 10:55

## 2022-06-23 RX ADMIN — ATORVASTATIN CALCIUM 40 MG: 40 TABLET, FILM COATED ORAL at 18:12

## 2022-06-23 RX ADMIN — SODIUM CHLORIDE: 9 INJECTION, SOLUTION INTRAVENOUS at 18:13

## 2022-06-23 RX ADMIN — INSULIN LISPRO 1 UNITS: 100 INJECTION, SOLUTION INTRAVENOUS; SUBCUTANEOUS at 21:01

## 2022-06-23 RX ADMIN — FLUVOXAMINE MALEATE 50 MG: 50 TABLET, COATED ORAL at 18:12

## 2022-06-23 RX ADMIN — ONDANSETRON 4 MG: 2 INJECTION INTRAMUSCULAR; INTRAVENOUS at 11:27

## 2022-06-23 RX ADMIN — PANTOPRAZOLE SODIUM 40 MG: 40 INJECTION, POWDER, FOR SOLUTION INTRAVENOUS at 12:42

## 2022-06-23 RX ADMIN — HYDROXYZINE PAMOATE 25 MG: 25 CAPSULE ORAL at 18:12

## 2022-06-23 RX ADMIN — HYDROXYZINE PAMOATE 25 MG: 25 CAPSULE ORAL at 20:54

## 2022-06-23 RX ADMIN — ENOXAPARIN SODIUM 30 MG: 100 INJECTION SUBCUTANEOUS at 18:12

## 2022-06-23 ASSESSMENT — PAIN - FUNCTIONAL ASSESSMENT
PAIN_FUNCTIONAL_ASSESSMENT: NONE - DENIES PAIN
PAIN_FUNCTIONAL_ASSESSMENT: 0-10
PAIN_FUNCTIONAL_ASSESSMENT: NONE - DENIES PAIN

## 2022-06-23 ASSESSMENT — PAIN SCALES - GENERAL
PAINLEVEL_OUTOF10: 3
PAINLEVEL_OUTOF10: 0

## 2022-06-23 ASSESSMENT — PAIN DESCRIPTION - LOCATION: LOCATION: ABDOMEN

## 2022-06-23 ASSESSMENT — PAIN DESCRIPTION - DESCRIPTORS: DESCRIPTORS: CRAMPING

## 2022-06-23 NOTE — ED NOTES
[facility] faxed, floor called to notify, spoke with [facility] to notify, placed in transport.       Abby Aaron RN  06/23/22 [id number]

## 2022-06-23 NOTE — ED PROVIDER NOTES
HPI:  6/23/22,   Time: 10:48 AM EDT       Kelly Perry is a 49 y.o. male presenting to the ED for nausea and vomiting, beginning 4 days ago. The complaint has been persistent, moderate in severity, and worsened by eating. Is a 54-year-old gentleman the patient states that he has been having nausea vomiting past 4 days. He states he has had similar spells like this in the past and nobody has been able to find a solution to his causing it. He had hernia surgery in the past but no other nominal surgeries. He is an insulin-dependent diabetic. No fevers or chills no abdominal pain at this time. Saw his PCP yesterday did outpatient lab work and was told his lipase and kidney function were not normal.  He was sent in for further evaluation. Patient states eating seems to make his symptoms worse. No diarrhea. He had a normal bowel movement this morning. He does not feel bloated. No fevers or chills no urinary complaints no chest pain or shortness of breath no back pain. Review of Systems:   Pertinent positives and negatives are stated within HPI, all other systems reviewed and are negative.          --------------------------------------------- PAST HISTORY ---------------------------------------------  Past Medical History:  has a past medical history of Anxiety, HLD (hyperlipidemia), HTN (hypertension), OCD (obsessive compulsive disorder), and [facility] Past Surgical History:  has a past surgical history that includes Wrist surgery (Right); Inguinal hernia repair; and Foot Tendon Surgery (Right). Social History:  reports that he has never smoked. His smokeless tobacco use includes chew. He reports current drug use. Drug: Marijuana [place] Mustard). He reports that he does not drink alcohol. Family History: family history includes Diabetes in his father. The patients home medications have been reviewed.     Allergies: Patient has no known allergies. ---------------------------------------------------PHYSICAL EXAM--------------------------------------    Constitutional/General: Alert and oriented x3, well appearing, non toxic in NAD  Head: Normocephalic and atraumatic  Eyes: PERRL, EOMI, conjunctive normal, sclera non icteric  Mouth: Oropharynx clear, handling secretions, no trismus, no asymmetry of the posterior oropharynx or uvular edema  Neck: Supple, full ROM, non tender to palpation in the midline, no stridor, no crepitus, no meningeal signs  Respiratory: Lungs clear to auscultation bilaterally, no wheezes, rales, or rhonchi. Not in respiratory distress  Cardiovascular:  Regular rate. Regular rhythm. No murmurs, gallops, or rubs. 2+ distal pulses  Chest: No chest wall tenderness  GI:  Abdomen Soft, Non tender, Non distended. +BS. No organomegaly, no palpable masses,  No rebound, guarding, or rigidity. Musculoskeletal: Moves all extremities x 4. Warm and well perfused, no clubbing, cyanosis, or edema. Capillary refill <3 seconds  Integument: skin warm and dry. No rashes. Lymphatic: no lymphadenopathy noted  Neurologic: GCS 15, no focal deficits, symmetric strength 5/5 in the upper and lower extremities bilaterally  Psychiatric: Normal Affect    -------------------------------------------------- RESULTS -------------------------------------------------  I have personally reviewed all laboratory and imaging results for this patient. Results are listed below.      LABS:  Results for orders placed or performed during the hospital encounter of 06/23/22   Comprehensive Metabolic Panel   Result Value Ref Range    Sodium 130 (L) 132 - 146 mmol/L    Potassium 3.7 3.5 - 5.0 mmol/L    Chloride 83 (L) 98 - 107 mmol/L    CO2 33 (H) 22 - 29 mmol/L    Anion Gap 14 7 - 16 mmol/L    Glucose 160 (H) 74 - 99 mg/dL    BUN 86 (H) 6 - 20 mg/dL    CREATININE 3.7 (H) 0.7 - 1.2 mg/dL    GFR Non-African American 18 >=60 mL/min/1.73    GFR African American 21 Calcium 9.3 8.6 - 10.2 mg/dL    Total Protein 7.4 6.4 - 8.3 g/dL    Albumin 4.1 3.5 - 5.2 g/dL    Total Bilirubin 0.5 0.0 - 1.2 mg/dL    Alkaline Phosphatase 84 40 - 129 U/L    ALT 11 0 - 40 U/L    AST 16 0 - 39 U/L   Lipase   Result Value Ref Range    Lipase 84 (H) 13 - 60 U/L   CBC with Auto Differential   Result Value Ref Range    WBC 10.5 4.5 - 11.5 E9/L    RBC 4.83 3.80 - 5.80 E12/L    Hemoglobin 14.0 12.5 - 16.5 g/dL    Hematocrit 41.5 37.0 - 54.0 %    MCV 85.9 80.0 - 99.9 fL    MCH 29.0 26.0 - 35.0 pg    MCHC 33.7 32.0 - 34.5 %    RDW 11.8 11.5 - 15.0 fL    Platelets 134 315 - 381 E9/L    MPV 8.5 7.0 - 12.0 fL    Neutrophils % 44.5 43.0 - 80.0 %    Immature Granulocytes % 0.4 0.0 - 5.0 %    Lymphocytes % 45.2 (H) 20.0 - 42.0 %    Monocytes % 9.2 2.0 - 12.0 %    Eosinophils % 0.2 0.0 - 6.0 %    Basophils % 0.5 0.0 - 2.0 %    Neutrophils Absolute 4.66 1.80 - 7.30 E9/L    Immature Granulocytes # 0.04 E9/L    Lymphocytes Absolute 4.72 (H) 1.50 - 4.00 E9/L    Monocytes Absolute 0.96 (H) 0.10 - 0.95 E9/L    Eosinophils Absolute 0.02 (L) 0.05 - 0.50 E9/L    Basophils Absolute 0.05 0.00 - 0.20 E9/L   Troponin   Result Value Ref Range    Troponin, High Sensitivity 53 (H) 0 - 11 ng/L   Urinalysis   Result Value Ref Range    Color, UA Yellow Straw/Yellow    Clarity, UA Clear Clear    Glucose, Ur Negative Negative mg/dL    Bilirubin Urine Negative Negative    Ketones, Urine Negative Negative mg/dL    Specific Gravity, UA 1.010 1.005 - 1.030    Blood, Urine Negative Negative    pH, UA 7.5 5.0 - 9.0    Protein, UA Negative Negative mg/dL    Urobilinogen, Urine 0.2 <2.0 E.U./dL    Nitrite, Urine Negative Negative    Leukocyte Esterase, Urine Negative Negative   Lactate, Sepsis   Result Value Ref Range    Lactic Acid, Sepsis 1.3 0.5 - 1.9 mmol/L   pH, venous   Result Value Ref Range    pH, Rony 7.37 7.35 - 7.45   Beta-Hydroxybutyrate   Result Value Ref Range    Beta-Hydroxybutyrate 0.23 0.02 - 0.27 mmol/L   Troponin   Result Value Ref Range    Troponin, High Sensitivity 35 (H) 0 - 11 ng/L   POCT Glucose   Result Value Ref Range    Glucose 160 mg/dL   EKG 12 Lead   Result Value Ref Range    Ventricular Rate 61 BPM    Atrial Rate 61 BPM    P-R Interval 148 ms    QRS Duration 78 ms    Q-T Interval 446 ms    QTc Calculation (Bazett) 448 ms    P Axis 70 degrees    R Axis 16 degrees    T Axis -14 degrees       RADIOLOGY:  Interpreted by Radiologist.  CT ABDOMEN PELVIS WO CONTRAST Additional Contrast? None   Final Result   1. No acute abnormality. 2. Left nonobstructive nephrolithiasis. 3. Small hiatal hernia. 4. Colonic diverticulosis without evidence diverticulitis. EKG:  EKG shows normal sinus rhythm at 61 beats minute no signs of any ST changes . No change in previous EKG. EKG interpreted by myself.      ------------------------- NURSING NOTES AND VITALS REVIEWED ---------------------------   The nursing notes within the ED encounter and vital signs as below have been reviewed by myself. /69   Pulse 57   Temp 97.9 °F (36.6 °C) (Oral)   Resp 16   Ht 5' 9\" (1.753 m)   Wt 180 lb (81.6 kg)   SpO2 97%   BMI 26.58 kg/m²   Oxygen Saturation Interpretation: Normal    The patients available past medical records and past encounters were reviewed. ------------------------------ ED COURSE/MEDICAL DECISION MAKING----------------------  Medications   ondansetron (ZOFRAN) injection 4 mg (4 mg IntraVENous Given 6/23/22 1127)   0.9 % sodium chloride IV bolus 2,448 mL (0 mL/kg × 81.6 kg IntraVENous Stopped 6/23/22 1241)   pantoprazole (PROTONIX) injection 40 mg (40 mg IntraVENous Given 6/23/22 1242)         ED COURSE:  ED Course as of 06/23/22 1539   Thu Jun 23, 2022   1229 Pressure improving up to 104/71. [EZ]   1453 Spoke to Dr. Leah Siddiqui who accepts the patient to his service.  [KK]   9578 Nausea and vomiting resolved after treatment here in the emergency department no further vomiting repeat abdominal exam soft nontender. Patient will be admitted for IV hydration for KHAI. He is comfortable with this plan. [OT]   9443 Blood pressure has improved to 112/60 2:09 liters of IV fluids. Patient has no further vomiting. [KK]      ED Course User Index  [KK] Gildardo Dominguez MD       Medical Decision Making:   Patient is a pleasant 50-year-old gentleman history of insulin-dependent diabetes comes in with 4 days of nausea and vomiting. Insulin-dependent diabetic and outpatient lab work showed elevated creatinine and lipase. No abdominal pain no fevers or chills no back pain no urinary complaints no chest pain. Patient will have given IV fluids and antinausea medication he is hypotensive on arrival.    Differential diagnoses KHAI pancreatitis dehydration DKA nausea vomiting        This patient has remained hemodynamically stable and been closely monitored during their ED course. EKG shows normal sinus rhythm at 61 beats a minute no signs of any ST elevation. Patient first troponin 53-second was 35 decreasing significantly likely due to 30 mill per KG bolusFeel likely elevated troponin was initially elevated due to significant KHAI with of fluids. Creatinine elevated at 3.7 above baseline of 1.3. Lipase is 84. No significant elevation. CBC was normal.  Urinalysis negative for acute infection lactic acid and patient is doing well. Patient not having signs of DKA. Anion gap is normal.  CT abdomen pelvis showed no acute abnormality nonobstructive left-sided kidney stone. Patient's vomiting is controlled emergency department repeat abdominal exam is soft and nontender. Given significant KHAI, decision was made to admit the patient for further IV hydration in house. Stable for admission. Re-Evaluations:             Re-evaluation.   Patients symptoms are improving    Re-examination  6/23/22   10:48 AM EDT          Vital Signs:   Vitals:    06/23/22 1122 06/23/22 1127 06/23/22 1205 06/23/22 1245   BP: (!) 92/52 97/67 104/71 112/69   Pulse:  62 65 57   Resp:  16     Temp:       TempSrc:       SpO2:  97%     Weight:       Height:           CRITICAL CARE:  33 MINUTES. Please note that the withdrawal or failure to initiate urgent interventions for this patient would likely result in a life threatening deterioration or permanent disability. Accordingly this patient received the above mentioned time, excluding separately billable procedures. Counseling: The emergency provider has spoken with the patient and discussed todays results, in addition to providing specific details for the plan of care and counseling regarding the diagnosis and prognosis. Questions are answered at this time and they are agreeable with the plan.       --------------------------------- IMPRESSION AND DISPOSITION ---------------------------------    IMPRESSION  1. KHAI (acute kidney injury) ([facility]    2. Non-intractable vomiting with nausea, unspecified vomiting type    3. Dehydration    4. Hypotension due to hypovolemia        DISPOSITION  Disposition: Admit to med/surg floor  Patient condition is fair    NOTE: This report was transcribed using voice recognition software.  Every effort was made to ensure accuracy; however, inadvertent computerized transcription errors may be present        Shruthi Azevedo MD  06/24/22 4979

## 2022-06-23 NOTE — PROGRESS NOTES
Notified Delfino Parekh NP on-call for Dr. Vyas Printers of retroperitoneal ultrasound results from this evening.

## 2022-06-23 NOTE — PROGRESS NOTES
Anticipated admission. Admission database completed to best of this RN's ability. Care plan and education initiated. Pt from home with wife. Denies any assistive devices with ambulation. Has glucometer and diabetic supplies. Denies any [facility] services prior to admission. Attempted to verify medications, pt stated that he does not know the name of his short acting insulin.

## 2022-06-23 NOTE — LETTER
[facility] [address] [facility]  [facility] [address]  Phone: [phone]      June 26, 2022     Patient: Yadi Killian     To whom it may concern    Yadi Killian was admitted in the hospital from June 23 to June 26. If you have questions, please do not hesitate to call me. I look forward to following Brad Overton along with you.     Sincerely,      Dr. Isiah White

## 2022-06-24 PROBLEM — I24.89 DEMAND ISCHEMIA: Status: RESOLVED | Noted: 2022-06-23 | Resolved: 2022-06-24

## 2022-06-24 PROBLEM — I24.8 DEMAND ISCHEMIA (HCC): Status: RESOLVED | Noted: 2022-06-23 | Resolved: 2022-06-24

## 2022-06-24 LAB
ALBUMIN SERPL-MCNC: 3.3 G/DL (ref 3.5–5.2)
ALP BLD-CCNC: 71 U/L (ref 40–129)
ALT SERPL-CCNC: 10 U/L (ref 0–40)
ANION GAP SERPL CALCULATED.3IONS-SCNC: 13 MMOL/L (ref 7–16)
AST SERPL-CCNC: 13 U/L (ref 0–39)
BASOPHILS ABSOLUTE: 0.05 E9/L (ref 0–0.2)
BASOPHILS RELATIVE PERCENT: 0.6 % (ref 0–2)
BILIRUB SERPL-MCNC: 0.4 MG/DL (ref 0–1.2)
BUN BLDV-MCNC: 50 MG/DL (ref 6–20)
CALCIUM SERPL-MCNC: 7.9 MG/DL (ref 8.6–10.2)
CHLORIDE BLD-SCNC: 99 MMOL/L (ref 98–107)
CO2: 22 MMOL/L (ref 22–29)
CREAT SERPL-MCNC: 1.7 MG/DL (ref 0.7–1.2)
EOSINOPHILS ABSOLUTE: 0.08 E9/L (ref 0.05–0.5)
EOSINOPHILS RELATIVE PERCENT: 0.9 % (ref 0–6)
GFR AFRICAN AMERICAN: 52
GFR NON-AFRICAN AMERICAN: 43 ML/MIN/1.73
GLUCOSE BLD-MCNC: 103 MG/DL (ref 74–99)
HCT VFR BLD CALC: 36.3 % (ref 37–54)
HEMOGLOBIN: 12.3 G/DL (ref 12.5–16.5)
IMMATURE GRANULOCYTES #: 0.04 E9/L
IMMATURE GRANULOCYTES %: 0.5 % (ref 0–5)
LIPASE: 70 U/L (ref 13–60)
LYMPHOCYTES ABSOLUTE: 4.57 E9/L (ref 1.5–4)
LYMPHOCYTES RELATIVE PERCENT: 53.9 % (ref 20–42)
MAGNESIUM: 1.8 MG/DL (ref 1.6–2.6)
MCH RBC QN AUTO: 29.4 PG (ref 26–35)
MCHC RBC AUTO-ENTMCNC: 33.9 % (ref 32–34.5)
MCV RBC AUTO: 86.6 FL (ref 80–99.9)
METER GLUCOSE: 116 MG/DL (ref 74–99)
METER GLUCOSE: 130 MG/DL (ref 74–99)
METER GLUCOSE: 166 MG/DL (ref 74–99)
METER GLUCOSE: 184 MG/DL (ref 74–99)
METER GLUCOSE: 93 MG/DL (ref 74–99)
MONOCYTES ABSOLUTE: 0.71 E9/L (ref 0.1–0.95)
MONOCYTES RELATIVE PERCENT: 8.4 % (ref 2–12)
NEUTROPHILS ABSOLUTE: 3.03 E9/L (ref 1.8–7.3)
NEUTROPHILS RELATIVE PERCENT: 35.7 % (ref 43–80)
PDW BLD-RTO: 11.6 FL (ref 11.5–15)
PLATELET # BLD: 310 E9/L (ref 130–450)
PMV BLD AUTO: 8.6 FL (ref 7–12)
POTASSIUM REFLEX MAGNESIUM: 3.5 MMOL/L (ref 3.5–5)
RBC # BLD: 4.19 E12/L (ref 3.8–5.8)
SODIUM BLD-SCNC: 134 MMOL/L (ref 132–146)
TOTAL PROTEIN: 6.1 G/DL (ref 6.4–8.3)
WBC # BLD: 8.5 E9/L (ref 4.5–11.5)

## 2022-06-24 PROCEDURE — 2580000003 HC RX 258: Performed by: STUDENT IN AN ORGANIZED HEALTH CARE EDUCATION/TRAINING PROGRAM

## 2022-06-24 PROCEDURE — 82962 GLUCOSE BLOOD TEST: CPT

## 2022-06-24 PROCEDURE — 83735 ASSAY OF MAGNESIUM: CPT

## 2022-06-24 PROCEDURE — 36415 COLL VENOUS BLD VENIPUNCTURE: CPT

## 2022-06-24 PROCEDURE — 83690 ASSAY OF LIPASE: CPT

## 2022-06-24 PROCEDURE — 1200000000 HC SEMI PRIVATE

## 2022-06-24 PROCEDURE — 6360000002 HC RX W HCPCS: Performed by: STUDENT IN AN ORGANIZED HEALTH CARE EDUCATION/TRAINING PROGRAM

## 2022-06-24 PROCEDURE — 6370000000 HC RX 637 (ALT 250 FOR IP): Performed by: STUDENT IN AN ORGANIZED HEALTH CARE EDUCATION/TRAINING PROGRAM

## 2022-06-24 PROCEDURE — 80053 COMPREHEN METABOLIC PANEL: CPT

## 2022-06-24 PROCEDURE — 6370000000 HC RX 637 (ALT 250 FOR IP): Performed by: INTERNAL MEDICINE

## 2022-06-24 PROCEDURE — 6360000002 HC RX W HCPCS: Performed by: INTERNAL MEDICINE

## 2022-06-24 PROCEDURE — 85025 COMPLETE CBC W/AUTO DIFF WBC: CPT

## 2022-06-24 PROCEDURE — C9113 INJ PANTOPRAZOLE SODIUM, VIA: HCPCS | Performed by: INTERNAL MEDICINE

## 2022-06-24 RX ORDER — ENOXAPARIN SODIUM 100 MG/ML
40 INJECTION SUBCUTANEOUS DAILY
Status: DISCONTINUED | OUTPATIENT
Start: 2022-06-24 | End: 2022-06-26 | Stop reason: HOSPADM

## 2022-06-24 RX ORDER — TRAZODONE HYDROCHLORIDE 50 MG/1
100 TABLET ORAL NIGHTLY
Status: DISCONTINUED | OUTPATIENT
Start: 2022-06-24 | End: 2022-06-26 | Stop reason: HOSPADM

## 2022-06-24 RX ORDER — PANTOPRAZOLE SODIUM 40 MG/10ML
40 INJECTION, POWDER, LYOPHILIZED, FOR SOLUTION INTRAVENOUS
Status: DISCONTINUED | OUTPATIENT
Start: 2022-06-24 | End: 2022-06-26 | Stop reason: HOSPADM

## 2022-06-24 RX ADMIN — HYDROXYZINE PAMOATE 25 MG: 25 CAPSULE ORAL at 13:48

## 2022-06-24 RX ADMIN — FLUVOXAMINE MALEATE 50 MG: 50 TABLET, COATED ORAL at 08:28

## 2022-06-24 RX ADMIN — INSULIN LISPRO 2 UNITS: 100 INJECTION, SOLUTION INTRAVENOUS; SUBCUTANEOUS at 11:12

## 2022-06-24 RX ADMIN — Medication 3 MG: at 20:17

## 2022-06-24 RX ADMIN — ENOXAPARIN SODIUM 40 MG: 100 INJECTION SUBCUTANEOUS at 08:28

## 2022-06-24 RX ADMIN — HYDROXYZINE PAMOATE 25 MG: 25 CAPSULE ORAL at 20:17

## 2022-06-24 RX ADMIN — INSULIN LISPRO 2 UNITS: 100 INJECTION, SOLUTION INTRAVENOUS; SUBCUTANEOUS at 16:44

## 2022-06-24 RX ADMIN — SODIUM CHLORIDE: 9 INJECTION, SOLUTION INTRAVENOUS at 02:15

## 2022-06-24 RX ADMIN — HYDROXYZINE PAMOATE 25 MG: 25 CAPSULE ORAL at 08:28

## 2022-06-24 RX ADMIN — TRAZODONE HYDROCHLORIDE 100 MG: 50 TABLET ORAL at 20:17

## 2022-06-24 RX ADMIN — ATORVASTATIN CALCIUM 40 MG: 40 TABLET, FILM COATED ORAL at 08:28

## 2022-06-24 RX ADMIN — PANTOPRAZOLE SODIUM 40 MG: 40 INJECTION, POWDER, FOR SOLUTION INTRAVENOUS at 11:12

## 2022-06-24 NOTE — CONSULTS
Mitch Gauthier M.D. [facility]  Dr. Josias Frye M.D.,  Dr. Lani Don M.D.,  Dr. Lesley Beal D.O.,  Dr. Ean Riojas D.O. ,  Dr. Elmo Lundy M.D.,  Dr. Dinah Arteaga D.O.        Cele Palmer  49 y.o.  male      Re: Recurrent nausea and vomiting  Requesting physician: Dr. Mariano Hoffmann  Date:2:58 PM 6/24/2022          HPI:  76-year-old male patient seen in the hospital for above-described issue. Patient apparently has been experiencing intermittent nausea and vomiting for approximately 2 years now. Patient reports that he has been worked up and from his report appears to have been done in our office by Dr. Maddi Cyr. It appears that patient had colonoscopy but he does not recall upper endoscopy. Patient reports intermittent episodes of nausea and vomiting. Patient does not recall having work-up with nuclear medicine solid-phase gastric emptying study. Patient reports that he has lost significant amount of weight mostly unintentionally in the past year or so. He denies dysphagia odynophagia though. He denies any significant abdominal pain. Denies diarrhea constipation. Apparently he was at his primary care physician and was found to have abnormal laboratory work with abnormal renal function and elevated lipase. Patient denies any significant alcohol abuse. He reports that he smokes prescription marijuana and has noticed no relation between his symptoms and marijuana use.     Information sources:   -Patient  -medical record  -health care team    PMHx:  Past Medical History:   Diagnosis Date    Anxiety     HLD (hyperlipidemia)     HTN (hypertension)     OCD (obsessive compulsive disorder)     Type 2 diabetes mellitus ([facility]        PSHx:  Past Surgical History:   Procedure Laterality Date    FOOT TENDON SURGERY Right     INGUINAL HERNIA REPAIR      WRIST SURGERY Right        Meds:  Current Facility-Administered Medications   Medication Dose Route Frequency Provider Last Rate Last Admin    enoxaparin (LOVENOX) injection 40 mg  40 mg SubCUTAneous Daily Briseida Manuel MD   40 mg at 06/24/22 0828    traZODone (DESYREL) tablet 100 mg  100 mg Oral Nightly Ovi Light DO        pantoprazole (PROTONIX) injection 40 mg  40 mg IntraVENous QAM AC Ovi JONO Light, DO   40 mg at 06/24/22 1112    0.9 % sodium chloride infusion   IntraVENous Continuous Briseida Manuel  mL/hr at 06/24/22 0521 Rate Verify at 06/24/22 0521    sodium chloride flush 0.9 % injection 10 mL  10 mL IntraVENous 2 times per day Briseida Manuel MD        sodium chloride flush 0.9 % injection 10 mL  10 mL IntraVENous PRN Briseida Manuel MD        0.9 % sodium chloride infusion   IntraVENous PRN Briseida Manuel MD        potassium chloride (KLOR-CON M) extended release tablet 40 mEq  40 mEq Oral PRN Briseida Manuel MD        Or    potassium bicarb-citric acid (EFFER-K) effervescent tablet 40 mEq  40 mEq Oral PRN Briseida Manuel MD        Or    potassium chloride 10 mEq/100 mL IVPB (Peripheral Line)  10 mEq IntraVENous PRN Briseida Manuel MD        ondansetron (ZOFRAN-ODT) disintegrating tablet 4 mg  4 mg Oral Q8H PRN Briseida Manuel MD        Or    ondansetron [facility]) injection 4 mg  4 mg IntraVENous Q6H PRN Briseida Manuel MD        [facility]) tablet 8.6 mg  1 tablet Oral Daily PRN Briseida Manuel MD        acetaminophen (TYLENOL) tablet 650 mg  650 mg Oral Q6H PRN Briseida Manuel MD        Or    acetaminophen (TYLENOL) suppository 650 mg  650 mg Rectal Q6H PRN Briseida Manuel MD        prochlorperazine (COMPAZINE) injection 10 mg  10 mg IntraVENous Q6H PRN Briseida Manuel MD        promethazine (PHENERGAN) injection 6.25 mg  6.25 mg IntraMUSCular Q6H PRN Briseida Manuel MD        melatonin tablet 3 mg  3 mg Oral Nightly PRN Briseida Manuel MD        insulin lispro (HUMALOG) injection vial 0-12 Units  0-12 Units SubCUTAneous TID  Briseida Manuel MD   2 Units at 06/24/22 1112    insulin lispro (HUMALOG) injection vial 0-6 Units 0-6 Units SubCUTAneous Nightly Jimenez Nelson MD   1 Units at 06/23/22 2101    glucose chewable tablet 16 g  4 tablet Oral PRN Jimenez Nelson MD        dextrose bolus 10% 125 mL  125 mL IntraVENous PRN Jimenez Nelson MD        Or    dextrose bolus 10% 250 mL  250 mL IntraVENous PRN Jimenez Nelson MD        glucagon (rDNA) injection 1 mg  1 mg IntraMUSCular PRN Jimenez Nelson MD        dextrose 5 % solution  100 mL/hr IntraVENous PRN Jimenez Nelson MD        atorvastatin (LIPITOR) tablet 40 mg  40 mg Oral Daily Jimenez Nelson MD   40 mg at 06/24/22 4400    diphenhydrAMINE (BENADRYL) tablet 25 mg  25 mg Oral Q8H PRN Jimenez Nelson MD        fluvoxaMINE (LUVOX) tablet 50 mg  50 mg Oral Daily Jimenez Nelson MD   50 mg at 06/24/22 0087    hydrOXYzine pamoate (VISTARIL) capsule 25 mg  25 mg Oral TID Jimenez Nelson MD   25 mg at 06/24/22 1348    insulin glargine (LANTUS) injection vial 20 Units  20 Units SubCUTAneous QAM Jimenez Nelson MD           SocHx:  Social History     Socioeconomic History    Marital status:      Spouse name: Not on file    Number of children: Not on file    Years of education: Not on file    Highest education level: Not on file   Occupational History    Not on file   Tobacco Use    Smoking status: Never Smoker    Smokeless tobacco: Current User     Types: Chew   Vaping Use    Vaping Use: Never used   Substance and Sexual Activity    Alcohol use: Never    Drug use: Yes     Types: Marijuana [employer])     Comment: medical marijuana    Sexual activity: Not on file   Other Topics Concern    Not on file   Social History Narrative    Not on file     Social Determinants of Health     Financial Resource Strain:     Difficulty of Paying Living Expenses: Not on file   Food Insecurity:     Worried About Running Out of Food in the Last Year: Not on file    Robyn of Food in the Last Year: Not on file   Transportation Needs:     Lack of Transportation (Medical):  Not on file    Lack of 06/24/2022     06/24/2022    MPV 8.6 06/24/2022     Lab Results   Component Value Date     06/24/2022    K 3.5 06/24/2022    CL 99 06/24/2022    CO2 22 06/24/2022    BUN 50 06/24/2022    CREATININE 1.7 06/24/2022    CALCIUM 7.9 06/24/2022    PROT 6.1 06/24/2022    LABALBU 3.3 06/24/2022    BILITOT 0.4 06/24/2022    ALKPHOS 71 06/24/2022    AST 13 06/24/2022    ALT 10 06/24/2022     Lab Results   Component Value Date    LIPASE 70 06/24/2022     No results found for: AMYLASE      ASSESSMENT/PLAN:  Patient Active Problem List   Diagnosis    Diabetic peripheral neuropathy (HCC)    L-S radiculopathy    [facility]    Intractable nausea and vomiting    DM (diabetes mellitus), type 2 (HCC)    Hyperlipidemia    Benign essential HTN    Dehydration     1. Nausea vomiting  -Recurrent intermittent nausea vomiting  -Possible gastroparesis flare versus cannabis hyperemesis syndrome  -Await gastric emptying study as ordered by admitting  -Patient advised on strict diabetic control  -Patient advised on low-fat diet with frequent small meals  -If not done as outpatient patient will require further evaluation with nonemergent upper endoscopy -plan for outpatient procedure    2. Elevated lipase  -Unremarkable noncontrasted CT  -Nonobstructive liver profile  -Nonelevated calcium; no significant history of alcohol abuse  -Obtain MRI  -Obtain fasting lipids  -Consider outpatient endoscopic ultrasound    3. Diabetes mellitus  -Per admitting/pertinent consultants    Thank you for the opportunity to see this patient in consultation    Suly Branch MD  6/24/2022  2:58 PM    NOTE:  This report was transcribed using voice recognition software. Every effort was made to ensure accuracy; however, inadvertent computerized transcription errors may be present.

## 2022-06-24 NOTE — H&P
Internal Medicine History & Physical     Name: Nabila Bolton  : 1975  Chief Complaint: Abnormal Lab ( sent him in for abnormal labs- kidney function and lipase )  Primary Care Physician: Lori aMuro MD  Admission date: 2022  Date of service: 2022     History of Present Illness  Micky is a 49y.o. year old male. He presented to the hospital with a chief complaint of nausea, vomiting as well as severe dehydration. He states that he has had nausea and vomiting for the last year. He is lost about 40 pounds over the last year. His nausea and vomiting has gotten much worse over the last 5 days. He cannot work. His PCP akhil blood work that revealed acute kidney injury and he was sent to the hospital.  He denies any other complaints or issues at this time. He does not have any significant abdominal pain. He is not having diarrhea. He has seen GI in the past.  Nothing particular seems to make his symptoms better or worse. Overall symptoms are severe in nature. His wife was present at bedside. History is provided by the patient and his wife. Both are felt to be great historians. ED course:   Initial blood work and imaging studies performed. Admission recommended by ED physician. Dr. Yong Hernandez discussed with ED provider.  Meds in ED consisted of the following:  ondansetron [facility]) injection 4 mg (4 mg IntraVENous Given 22 1127)   0.9 % sodium chloride IV bolus 2,448 mL (0 mL/kg × 81.6 kg IntraVENous Stopped 22 1241)   pantoprazole (PROTONIX) injection 40 mg (40 mg IntraVENous Given 22 1242)       Past Medical History:   Diagnosis Date    Anxiety     HLD (hyperlipidemia)     HTN (hypertension)     OCD (obsessive compulsive disorder)     Type 2 diabetes mellitus ([address]        Past Surgical History:   Procedure Laterality Date    FOOT TENDON SURGERY Right     INGUINAL HERNIA REPAIR      WRIST SURGERY Right        Family Medical History:  Family History   Problem Relation Age of Onset    Diabetes Father        Social History  Patient lives at home with his wife. Employment: [id number]  Illicit drug use- denies  TOBACCO:   reports that he has never smoked. His smokeless tobacco use includes chew. ETOH:   reports no history of alcohol use. Home Medications  Prior to Admission medications    Medication Sig Start Date End Date Taking? Authorizing Provider   diphenhydrAMINE (BENADRYL ALLERGY) 25 MG capsule Take 1 capsule by mouth every 8 hours as needed (nausea, vomiting, TO TAKE WITH REGLAN) Take with 1 dose of prescribed REGLAN (metoclopramide). 4/13/22   Mark Randhawa,    ibuprofen (IBU) 800 MG tablet Take 1 tablet by mouth every 8 hours as needed for Pain 4/13/22 4/20/22  Syed Hernández DO   DULoxetine (CYMBALTA) 60 MG extended release capsule Take 1 capsule by mouth daily 4/10/21   Historical Provider, MD   fluvoxaMINE (LUVOX) 50 MG tablet Take 1 tablet by mouth daily 4/15/21   Historical Provider, MD   lisinopril (PRINIVIL;ZESTRIL) 5 MG tablet Take 1 tablet by mouth daily 4/15/21   Historical Provider, MD   traZODone (DESYREL) 50 MG tablet Take 2 tablets by mouth nightly 3/23/21   Historical Provider, MD   atorvastatin (LIPITOR) 40 MG tablet Take 1 tablet by mouth daily 4/15/21   Historical Provider, MD   hydrOXYzine (VISTARIL) 25 MG capsule Take 1 capsule by mouth 3 times daily 4/15/21   Historical Provider, MD   LANTUS SOLOSTAR 100 UNIT/ML injection pen 20 Units every morning 2/8/21   Historical Provider, MD   metFORMIN (GLUCOPHAGE) 500 MG tablet Take 1 tablet by mouth 2 times daily 4/15/21   Historical Provider, MD   Cholecalciferol (VITAMIN D-3) 25 MCG (1000 UT) CAPS Take 1 capsule by mouth daily     Historical Provider, MD       Allergies  No Known Allergies    Review of Systems:  Please see HPI above. All bolded are positive. All un-bolded are negative.   Constitutional Symptoms: fever, chills, fatigue, generalized weakness, diaphoresis, increase in thirst, loss of appetite  Eyes: vision change   Ears, Nose, Mouth, Throat: hearing loss, nasal congestion, sores in the mouth  Cardiovascular: chest pain, chest heaviness, palpitations  Respiratory: shortness of breath, wheezing, coughing  Gastrointestinal: abdominal pain, nausea, vomiting, diarrhea, constipation, melena, hematochezia, hematemesis  Genitourinary: dysuria, hematuria, increased frequency  Musculoskeletal: lower extremity edema, myalgias, arthralgias, back pain  Integumentary: rashes, itching   Neurological: headache, lightheadedness, dizziness, confusion, syncope, numbness, tingling, focal weakness  Psychiatric: depression, suicidal ideation, anxiety  Endocrine: unintentional weight change  Hematologic/Lymphatic: lymphadenopathy, easy bruising, easy bleeding   Allergic/Immunologic: recurrent infections      Objective  VITALS:  /69   Pulse 61   Temp 98.2 °F (36.8 °C) (Oral)   Resp 18   Ht 5' 9\" (1.753 m)   Wt 178 lb (80.7 kg)   SpO2 98%   BMI 26.29 kg/m²     Physical Exam:   General: awake, alert, oriented to person, place, time, and purpose, appears stated age, cooperative, no acute distress, pleasant, appropriate mood  Eyes: conjunctivae/corneas clear, sclera non icteric, EOMI  Ears: no obvious scars, no lesions, no masses, hearing intact  Mouth: mucous membranes dry, no obvious oral sores  Head: normocephalic, atraumatic  Neck: no JVD, no adenopathy, no thyromegaly, neck is supple, trachea is midline  Back: ROM normal, no CVA tenderness.   Chest: no pain on palpation  Lungs: clear to auscultation bilaterally, without rhonchi, crackle, wheezing, or rale, no retractions or use of accessory muscles  Heart: regular rate and regular rhythm, no murmur, normal S1, S2  Abdomen: soft, non-tender; bowel sounds normal; no masses, no organomegaly  : Deferred   Extremities: no lower extremity edema, extremities atraumatic, no cyanosis, no clubbing, 2+ pedal pulses palpated  Skin: normal color, normal texture, normal turgor, no rashes, no lesions  Neurologic:5/5 muscle strength throughout, normal muscle tone throughout, face symmetric, hearing intact, tongue midline, speech appropriate without slurring, sensation to fine touch intact in upper and lower extremities    Labs-   Lab Results   Component Value Date    WBC 8.5 06/24/2022    HGB 12.3 (L) 06/24/2022    HCT 36.3 (L) 06/24/2022     06/24/2022     06/24/2022    K 3.5 06/24/2022    CL 99 06/24/2022    CREATININE 1.7 (H) 06/24/2022    BUN 50 (H) 06/24/2022    CO2 22 06/24/2022    GLUCOSE 103 (H) 06/24/2022    ALT 10 06/24/2022    AST 13 06/24/2022     No results found for: CKTOTAL, CKMB, CKMBINDEX, TROPONINI      Recent Radiological Studies:  US RETROPERITONEAL COMPLETE   Final Result   1. Benign bilateral renal cysts. 2. Nonobstructive calculus in the lower pole left kidney on CT is not   appreciated with ultrasound. 3. There is distention of the urinary bladder which is otherwise unremarkable   in appearance. CT ABDOMEN PELVIS WO CONTRAST Additional Contrast? None   Final Result   1. No acute abnormality. 2. Left nonobstructive nephrolithiasis. 3. Small hiatal hernia. 4. Colonic diverticulosis without evidence diverticulitis.          NM GASTRIC EMPTYING    (Results Pending)       Assessment  Active Hospital Problems    Diagnosis     KHAI (acute kidney injury) ([facility] [address] [N17.9]      Priority: High    Intractable nausea and vomiting [R11.2]      Priority: Medium    DM (diabetes mellitus), type 2 ([address] [E11.9]      Priority: Medium    Dehydration [E86.0]      Priority: Medium    Hyperlipidemia [E78.5]     Benign essential HTN [I10]     Diabetic peripheral neuropathy ([address] [E11.42]     L-S radiculopathy [M54.17]        Patient Active Problem List    Diagnosis Date Noted    KHAI (acute kidney injury) ([address] 06/23/2022     Priority: High    Intractable nausea and vomiting 06/23/2022     Priority: Medium    DM (diabetes mellitus), type 2 ([facility] 06/23/2022     Priority: Medium    Dehydration 06/23/2022     Priority: Medium    Hyperlipidemia 06/23/2022    Benign essential HTN 06/23/2022    Diabetic peripheral neuropathy (HCC) 04/29/2021    L-S radiculopathy 04/29/2021       Plan  · KHAI (due to dehydration from recurrent N/V) on CKD3:   · Baseline sCr ~ 1.3  · Follow BMP. · IVF. · Holding Lisinopril  · DM, uncontrolled:   · Continue Lantus  · Holding Metformin due to KHAI  · SSI. · HbA1c 7.9%. · Recurrent nausea and vomiting and weight loss-- likely diabetic gastroparesis:  · GI consult  · Gastric emptying study   · IV PPI  · Prn IV Zofran   · Recheck lipase  · Continue home medications other than as noted above. · PT/OT  · Follow labs  · DVT prophylaxis. · Please see orders for further management and care. ·  for discharge planning  · Discharge plan: TBD pending clinical improvement     Ovi Light DO  6/24/2022  11:25 AM    I can be reached through [place]. NOTE:  This report was transcribed using voice recognition software. Every effort was made to ensure accuracy; however, inadvertent computerized transcription errors may be present.

## 2022-06-24 NOTE — PROGRESS NOTES
Physical Therapy  Facility/Department: [facility] MED SURG      Name: Jhonatan Avelar  : 1975  MRN: [id number]  Date of Service: 2022    Order received for PT evaluation. Pt up independently in room. Denies any PT needs at this time.    Dilia Romo PT [id number]

## 2022-06-24 NOTE — PROGRESS NOTES
Dr. Mukul Barber, DO,    Your patient is on a medication that requires a renal and/or weight dose adjustment. Renal/Body Weight Function Assessment:    Date Body Weight IBW  Adjusted BW SCr  CrCl Dialysis status   6/24/2022 178 lb (80.7 kg)  Ideal body weight: 70.7 kg (155 lb 13.8 oz)  Adjusted ideal body weight: 74.7 kg (164 lb 11.5 oz) Serum creatinine: 1.7 mg/dL (H) 06/24/22 0230  Estimated creatinine clearance: 53 mL/min (A) N/A       Pharmacy has dose-adjusted the following medication(s):    Date Previous Order Adjusted Order   6/24/2022 Enoxaparin 30 mg daily Enoxaparin 40 mg daily       These changes were made per protocol according to the [facility] Renal Dosing Policy/ [facility] Pharmacist Anticoagulant Review. *Please note this dose may need readjusted if your patient's condition changes. Please contact pharmacy with any questions regarding these changes.     WINNIE Elise [facility]  6/24/2022  7:34 AM

## 2022-06-24 NOTE — PROGRESS NOTES
Occupational Therapy    OT eval and treat orders received and chart reviewed. Attempt made but pt reports he is able to complete own self care and walk to and from bathroom. Pt with no acute OT needs at this time and no further questions or concerns. OT orders discontinued and pt agreed.     Lorraine Hankins, OTR/L

## 2022-06-24 NOTE — PROGRESS NOTES
I was messaged by nursing with the results of a retroperitoneal US. I have ordered a PVR. Dr. Tamara Watkins will see the patient in the morning. Please call me with any urgent matters.

## 2022-06-24 NOTE — CARE COORDINATION
Introduced my self and provided explanation of CM role to patient. Patient is awake, alert, and aware of current diagnosis and treatment plan including IV fluids, serial labs. Patient verbalizes no concerns and identifies no areas to focus on nor barriers to discharge. He resides at home with his wife and indicates he completes his adl's with independence. He uses no adaptive equipment. He still operates a motor vehicle. Patient is established with a pcp and denies any issue with retail pharmaceutical coverage. He denies post discharge needs. Explained ELOS of 24-48 hours; patient voiced understanding and agreement. Will follow along with  and assist with discharge planning as necessary. Can [place].  Kim, MSN, RN  [facility] Case Management  [phone]

## 2022-06-25 ENCOUNTER — APPOINTMENT (OUTPATIENT)
Dept: NUCLEAR MEDICINE | Age: 47
DRG: 074 | End: 2022-06-25
Payer: COMMERCIAL

## 2022-06-25 ENCOUNTER — APPOINTMENT (OUTPATIENT)
Dept: MRI IMAGING | Age: 47
DRG: 074 | End: 2022-06-25
Payer: COMMERCIAL

## 2022-06-25 LAB
ALBUMIN SERPL-MCNC: 3 G/DL (ref 3.5–5.2)
ALP BLD-CCNC: 64 U/L (ref 40–129)
ALT SERPL-CCNC: 9 U/L (ref 0–40)
ANION GAP SERPL CALCULATED.3IONS-SCNC: 8 MMOL/L (ref 7–16)
AST SERPL-CCNC: 12 U/L (ref 0–39)
BASOPHILS ABSOLUTE: 0.05 E9/L (ref 0–0.2)
BASOPHILS RELATIVE PERCENT: 0.6 % (ref 0–2)
BILIRUB SERPL-MCNC: 0.6 MG/DL (ref 0–1.2)
BUN BLDV-MCNC: 16 MG/DL (ref 6–20)
CALCIUM SERPL-MCNC: 7.6 MG/DL (ref 8.6–10.2)
CHLORIDE BLD-SCNC: 108 MMOL/L (ref 98–107)
CHOLESTEROL, TOTAL: 90 MG/DL (ref 0–199)
CO2: 19 MMOL/L (ref 22–29)
CREAT SERPL-MCNC: 1 MG/DL (ref 0.7–1.2)
EOSINOPHILS ABSOLUTE: 0.12 E9/L (ref 0.05–0.5)
EOSINOPHILS RELATIVE PERCENT: 1.5 % (ref 0–6)
GFR AFRICAN AMERICAN: >60
GFR NON-AFRICAN AMERICAN: >60 ML/MIN/1.73
GLUCOSE BLD-MCNC: 140 MG/DL (ref 74–99)
HCT VFR BLD CALC: 34.2 % (ref 37–54)
HDLC SERPL-MCNC: 32 MG/DL
HEMOGLOBIN: 11.7 G/DL (ref 12.5–16.5)
IMMATURE GRANULOCYTES #: 0.02 E9/L
IMMATURE GRANULOCYTES %: 0.3 % (ref 0–5)
LDL CHOLESTEROL CALCULATED: 30 MG/DL (ref 0–99)
LYMPHOCYTES ABSOLUTE: 3.95 E9/L (ref 1.5–4)
LYMPHOCYTES RELATIVE PERCENT: 49.5 % (ref 20–42)
MCH RBC QN AUTO: 29.3 PG (ref 26–35)
MCHC RBC AUTO-ENTMCNC: 34.2 % (ref 32–34.5)
MCV RBC AUTO: 85.5 FL (ref 80–99.9)
METER GLUCOSE: 102 MG/DL (ref 74–99)
METER GLUCOSE: 124 MG/DL (ref 74–99)
METER GLUCOSE: 126 MG/DL (ref 74–99)
METER GLUCOSE: 203 MG/DL (ref 74–99)
MONOCYTES ABSOLUTE: 0.59 E9/L (ref 0.1–0.95)
MONOCYTES RELATIVE PERCENT: 7.4 % (ref 2–12)
NEUTROPHILS ABSOLUTE: 3.25 E9/L (ref 1.8–7.3)
NEUTROPHILS RELATIVE PERCENT: 40.7 % (ref 43–80)
PDW BLD-RTO: 11.5 FL (ref 11.5–15)
PLATELET # BLD: 287 E9/L (ref 130–450)
PMV BLD AUTO: 8.3 FL (ref 7–12)
POTASSIUM REFLEX MAGNESIUM: 4.1 MMOL/L (ref 3.5–5)
RBC # BLD: 4 E12/L (ref 3.8–5.8)
SODIUM BLD-SCNC: 135 MMOL/L (ref 132–146)
TOTAL PROTEIN: 5.4 G/DL (ref 6.4–8.3)
TRIGL SERPL-MCNC: 141 MG/DL (ref 0–149)
VLDLC SERPL CALC-MCNC: 28 MG/DL
WBC # BLD: 8 E9/L (ref 4.5–11.5)

## 2022-06-25 PROCEDURE — A9541 TC99M SULFUR COLLOID: HCPCS | Performed by: RADIOLOGY

## 2022-06-25 PROCEDURE — 6370000000 HC RX 637 (ALT 250 FOR IP): Performed by: STUDENT IN AN ORGANIZED HEALTH CARE EDUCATION/TRAINING PROGRAM

## 2022-06-25 PROCEDURE — 85025 COMPLETE CBC W/AUTO DIFF WBC: CPT

## 2022-06-25 PROCEDURE — 36415 COLL VENOUS BLD VENIPUNCTURE: CPT

## 2022-06-25 PROCEDURE — 3430000000 HC RX DIAGNOSTIC RADIOPHARMACEUTICAL: Performed by: RADIOLOGY

## 2022-06-25 PROCEDURE — 80053 COMPREHEN METABOLIC PANEL: CPT

## 2022-06-25 PROCEDURE — A9579 GAD-BASE MR CONTRAST NOS,1ML: HCPCS | Performed by: RADIOLOGY

## 2022-06-25 PROCEDURE — 1200000000 HC SEMI PRIVATE

## 2022-06-25 PROCEDURE — 78264 GASTRIC EMPTYING IMG STUDY: CPT

## 2022-06-25 PROCEDURE — 6360000004 HC RX CONTRAST MEDICATION: Performed by: RADIOLOGY

## 2022-06-25 PROCEDURE — 2580000003 HC RX 258: Performed by: STUDENT IN AN ORGANIZED HEALTH CARE EDUCATION/TRAINING PROGRAM

## 2022-06-25 PROCEDURE — 82962 GLUCOSE BLOOD TEST: CPT

## 2022-06-25 PROCEDURE — 74183 MRI ABD W/O CNTR FLWD CNTR: CPT

## 2022-06-25 PROCEDURE — 6360000002 HC RX W HCPCS: Performed by: STUDENT IN AN ORGANIZED HEALTH CARE EDUCATION/TRAINING PROGRAM

## 2022-06-25 PROCEDURE — 6370000000 HC RX 637 (ALT 250 FOR IP): Performed by: INTERNAL MEDICINE

## 2022-06-25 PROCEDURE — 80061 LIPID PANEL: CPT

## 2022-06-25 RX ADMIN — ENOXAPARIN SODIUM 40 MG: 100 INJECTION SUBCUTANEOUS at 12:53

## 2022-06-25 RX ADMIN — SODIUM CHLORIDE, PRESERVATIVE FREE 10 ML: 5 INJECTION INTRAVENOUS at 12:54

## 2022-06-25 RX ADMIN — ATORVASTATIN CALCIUM 40 MG: 40 TABLET, FILM COATED ORAL at 12:53

## 2022-06-25 RX ADMIN — HYDROXYZINE PAMOATE 25 MG: 25 CAPSULE ORAL at 13:05

## 2022-06-25 RX ADMIN — FLUVOXAMINE MALEATE 50 MG: 50 TABLET, COATED ORAL at 12:53

## 2022-06-25 RX ADMIN — SODIUM CHLORIDE: 9 INJECTION, SOLUTION INTRAVENOUS at 01:08

## 2022-06-25 RX ADMIN — GADOTERIDOL 17 ML: 279.3 INJECTION, SOLUTION INTRAVENOUS at 15:58

## 2022-06-25 RX ADMIN — TRAZODONE HYDROCHLORIDE 100 MG: 50 TABLET ORAL at 21:39

## 2022-06-25 RX ADMIN — Medication 1 MILLICURIE: at 07:22

## 2022-06-25 RX ADMIN — INSULIN LISPRO 2 UNITS: 100 INJECTION, SOLUTION INTRAVENOUS; SUBCUTANEOUS at 21:39

## 2022-06-25 RX ADMIN — HYDROXYZINE PAMOATE 25 MG: 25 CAPSULE ORAL at 21:39

## 2022-06-25 NOTE — PROGRESS NOTES
Internal Medicine Progress Note    Admission date: 6/23/2022  Primary care physician: Edil Fowler MD    Subjective  Cuco Yates was seen and examined at bedside today. Wife was present during my examination. Cuco Yates states that he is feeling relatively well. The abdominal pain/nausea is pretty much resolved. Just came back from gastric emptying study. Review of Systems  There are no new complaints of chest pain, shortness of breath, abdominal pain, nausea, vomiting, diarrhea, constipation, headaches, fevers, chills, lightheadedness, dizziness, calf pain.     Hospital Medications  Current Facility-Administered Medications   Medication Dose Route Frequency Provider Last Rate Last Admin    enoxaparin (LOVENOX) injection 40 mg  40 mg SubCUTAneous Daily Esmer Gibbs MD   40 mg at 06/24/22 0828    traZODone (DESYREL) tablet 100 mg  100 mg Oral Nightly Ovi Light, DO   100 mg at 06/24/22 2017    pantoprazole (PROTONIX) injection 40 mg  40 mg IntraVENous QAM AC Ovi Light, DO   40 mg at 06/24/22 1112    0.9 % sodium chloride infusion   IntraVENous Continuous Esmer Gibbs  mL/hr at 06/25/22 0500 Rate Verify at 06/25/22 0500    sodium chloride flush 0.9 % injection 10 mL  10 mL IntraVENous 2 times per day Esmer Gibbs MD        sodium chloride flush 0.9 % injection 10 mL  10 mL IntraVENous PRN Esmer Gibbs MD        0.9 % sodium chloride infusion   IntraVENous PRN Esmer Gibbs MD        potassium chloride (KLOR-CON M) extended release tablet 40 mEq  40 mEq Oral PRN Esmer Gibbs MD        Or    potassium bicarb-citric acid (EFFER-K) effervescent tablet 40 mEq  40 mEq Oral PRN Esmer Gibbs MD        Or    potassium chloride 10 mEq/100 mL IVPB (Peripheral Line)  10 mEq IntraVENous PRN Esmer Gibbs MD        ondansetron (ZOFRAN-ODT) disintegrating tablet 4 mg  4 mg Oral Q8H PRN Esmer Gibbs MD        Or    [facility]ron [facility]) injection 4 mg  4 mg IntraVENous Q6H PRN Esmer Gibbs MD  senna (SENOKOT) tablet 8.6 mg  1 tablet Oral Daily PRN Laury Homans, MD        acetaminophen (TYLENOL) tablet 650 mg  650 mg Oral Q6H PRN Laury Homans, MD        Or    acetaminophen (TYLENOL) suppository 650 mg  650 mg Rectal Q6H PRN Laury Homans, MD        prochlorperazine (COMPAZINE) injection 10 mg  10 mg IntraVENous Q6H PRN Laury Homans, MD        promethazine (PHENERGAN) injection 6.25 mg  6.25 mg IntraMUSCular Q6H PRN Laury Homans, MD        melatonin tablet 3 mg  3 mg Oral Nightly PRN Laury Homans, MD   3 mg at 06/24/22 2017    insulin lispro (HUMALOG) injection vial 0-12 Units  0-12 Units SubCUTAneous TID WC Laury Homans, MD   2 Units at 06/24/22 1644    insulin lispro (HUMALOG) injection vial 0-6 Units  0-6 Units SubCUTAneous Nightly Laury Homans, MD   1 Units at 06/23/22 2101    glucose chewable tablet 16 g  4 tablet Oral PRN Laury Homans, MD        dextrose bolus 10% 125 mL  125 mL IntraVENous PRN Laury Homans, MD        Or    dextrose bolus 10% 250 mL  250 mL IntraVENous PRN Laury Homans, MD        glucagon (rDNA) injection 1 mg  1 mg IntraMUSCular PRN Laury Homans, MD        dextrose 5 % solution  100 mL/hr IntraVENous PRN Laury Homans, MD        atorvastatin (LIPITOR) tablet 40 mg  40 mg Oral Daily Laury Homans, MD   40 mg at 06/24/22 1026    diphenhydrAMINE (BENADRYL) tablet 25 mg  25 mg Oral Q8H PRN Laury Homans, MD        fluvoxaMINE (LUVOX) tablet 50 mg  50 mg Oral Daily Laury Homans, MD   50 mg at 06/24/22 4145    hydrOXYzine pamoate (VISTARIL) capsule 25 mg  25 mg Oral TID Laury Homans, MD   25 mg at 06/24/22 2017    insulin glargine (LANTUS) injection vial 20 Units  20 Units SubCUTAneous QAM Laury Homans, MD           PRN Medications  sodium chloride flush, sodium chloride, potassium chloride **OR** potassium alternative oral replacement **OR** potassium chloride, ondansetron **OR** ondansetron, senna, acetaminophen **OR** acetaminophen, prochlorperazine, promethazine, melatonin, glucose, dextrose bolus **OR** dextrose bolus, glucagon (rDNA), dextrose, diphenhydrAMINE    Objective  Most Recent Recorded Vitals  /68   Pulse 55   Temp 97.8 °F (36.6 °C) (Oral)   Resp 18   Ht 5' 9\" (1.753 m)   Wt 188 lb 4.8 oz (85.4 kg)   SpO2 99%   BMI 27.81 kg/m²   I/O last 3 completed shifts: In: 3927.1 [P.O.:720; I.V.:3207.1]  Out: 2900 [Urine:2900]  No intake/output data recorded. Physical Exam:  General: AAO to person, place, time, and purpose, NAD, no labored breathing  Eyes: conjunctivae/corneas clear, sclera non icteric. Skin: color, texture, and turgor normal, no rashes or lesions. Lungs: clear to auscultation bilaterally, no retractions or use of accessory muscles, no vocal fremitus, no rhonchi, no crackle, no rales  Heart: regular rate and regular rhythm, no murmur  Abdomen: soft, non-tender; bowel sounds normal; no masses,  no organomegaly  Extremities: atraumatic, no cyanosis, no edema  Neurologic: cranial nerves 2-12 grossly intact, no slurred speech. Most Recent Labs  Lab Results   Component Value Date    WBC 8.0 06/25/2022    HGB 11.7 (L) 06/25/2022    HCT 34.2 (L) 06/25/2022     06/25/2022     06/25/2022    K 4.1 06/25/2022     (H) 06/25/2022    CREATININE 1.0 06/25/2022    BUN 16 06/25/2022    CO2 19 (L) 06/25/2022    GLUCOSE 140 (H) 06/25/2022    ALT 9 06/25/2022    AST 12 06/25/2022    TSH 1.340 06/23/2022    LABA1C 7.9 (H) 06/23/2022       *All labs in electric medical record were reviewed. Please see electronic chart for a more comprehensive set of labs    CT ABDOMEN PELVIS WO CONTRAST Additional Contrast? None    Result Date: 6/23/2022  EXAMINATION: CT OF THE ABDOMEN AND PELVIS WITHOUT CONTRAST 6/23/2022 11:14 am TECHNIQUE: CT of the abdomen and pelvis was performed without the administration of intravenous contrast. Multiplanar reformatted images are provided for review.  Automated exposure control, iterative reconstruction, and/or weight based adjustment of the mA/kV was utilized to reduce the radiation dose to as low as reasonably achievable. COMPARISON: CT abdomen and pelvis 07/04/2021 HISTORY: ORDERING SYSTEM PROVIDED HISTORY: nausea and vomiting TECHNOLOGIST PROVIDED HISTORY: Additional Contrast?->None Reason for exam:->nausea and vomiting Decision Support Exception - unselect if not a suspected or confirmed emergency medical condition->Emergency Medical Condition (MA) FINDINGS: There is a nonobstructive calculus in the lower pole of the left kidney which measures approximately 2 mm in size. There is no additional evidence of urolithiasis. There is no hydronephrosis. The urinary bladder is unremarkable in appearance. Evaluation of the solid intra-abdominal organs is limited without administration of intravenous contrast.  However, the liver, pancreas, and adrenal glands are unremarkable. There is a low-density lesion along the anterior aspect of the spleen which measures approximately 8 mm in size and likely represents a benign finding such as cyst.  There are bilateral renal cysts. The gallbladder is intact without evidence of biliary ductal dilatation. There is no evidence of bowel obstruction, pneumoperitoneum, or ascites. There is colonic diverticulosis without evidence of diverticulitis. The appendix is unremarkable in appearance. There is arteriosclerosis without abdominal aortic aneurysm. There is a small hiatal hernia. There are calcifications within the coronary arteries. There is minimal dependent subsegmental atelectasis and/or scarring within the bilateral lower lobes. There are degenerative changes within the spine. There is evidence of prior right inguinal hernia repair. 1. No acute abnormality. 2. Left nonobstructive nephrolithiasis. 3. Small hiatal hernia. 4. Colonic diverticulosis without evidence diverticulitis.      US RETROPERITONEAL COMPLETE    Result Date: 6/23/2022  EXAMINATION: RETROPERITONEAL ULTRASOUND OF THE KIDNEYS AND URINARY BLADDER 6/23/2022 COMPARISON: CT abdomen 06/23/2022 HISTORY: ORDERING SYSTEM PROVIDED HISTORY: KHAI TECHNOLOGIST PROVIDED HISTORY: Reason for exam:->KHAI What reading provider will be dictating this exam?->CRC FINDINGS: Multiple longitudinal and transverse grayscale images were obtained of the bilateral kidneys and urinary bladder. The right kidney measures approximately 11.9 cm in length, and the left kidney measures approximately 11.7 cm in length. There is anechoic cyst in the mid right kidney which measures approximately 1.9 cm in size. There is a cyst in the mid left kidney measuring approximately 1.7 cm in size. There is a nonobstructive calculus in the lower pole left kidney on CT which is not appreciated on ultrasound. There is no evidence of solid mass or hydronephrosis bilaterally. Renal cortical thickness and echotexture appear to be within normal limits. The urinary bladder is unremarkable in appearance with prevoid volume estimated at 577 mL. Images were not obtained postvoid. 1. Benign bilateral renal cysts. 2. Nonobstructive calculus in the lower pole left kidney on CT is not appreciated with ultrasound. 3. There is distention of the urinary bladder which is otherwise unremarkable in appearance. Assessment/Plan  Nabila Bolton is a 49y.o. year old male who presented on 6/23/2022 and is being treated for KHAI (acute kidney injury) ([address] . Complete Problem List:    Patient Active Problem List    Diagnosis Date Noted    KHAI (acute kidney injury) ([address] 06/23/2022    Intractable nausea and vomiting 06/23/2022    DM (diabetes mellitus), type 2 ([address] 06/23/2022    Hyperlipidemia 06/23/2022    Benign essential HTN 06/23/2022    Dehydration 06/23/2022    Diabetic peripheral neuropathy ([address] 04/29/2021    L-S radiculopathy 04/29/2021     Plan  · KHAI (due to dehydration from recurrent N/V) on CKD3:   ? Baseline sCr ~ 1.3  ? Follow BMP. ? IVF. ?  Holding Lisinopril  ? sCr 1.0 today  · DM, uncontrolled:   ? Continue Lantus  ? Holding Metformin due to KHAI  ? SSI.   ? HbA1c 7.9%. · Recurrent nausea and vomiting and weight loss-- likely diabetic gastroparesis:  ? GI consult  ? Gastric emptying study -done today pending results  ? IV PPI  ? Prn IV Zofran   ? Recheck lipase  ? MRCP pending  · Continue home medications other than as noted above. · PT/OT  · Follow labs  · DVT prophylaxis. · Please see orders for further management and care. ·  for discharge planning  · Discharge plan: likely to home tomorrow, pending GI     Electronically signed by Fang Guzman MD on 6/25/2022 at 10:11 AM      NOTE:  This report was transcribed using voice recognition software. Every effort was made to ensure accuracy; however, inadvertent computerized transcription errors may be present.

## 2022-06-25 NOTE — CARE COORDINATION
Patient off the floor during rounds. Will see later today or tomorrow. Please, call with questions/concerns.   Thank you    Evangelina Bailey MD

## 2022-06-25 NOTE — PLAN OF CARE
Problem: Discharge Planning  Goal: Discharge to home or other facility with appropriate resources  Outcome: Progressing     Problem: Pain  Goal: Verbalizes/displays adequate comfort level or baseline comfort level  6/24/2022 2226 by Sathish Hargrove  Outcome: Progressing  6/24/2022 1329 by Mikhail Gonzalez RN  Outcome: Progressing     Problem: Safety - Adult  Goal: Free from fall injury  6/24/2022 2226 by Sathish Hargrove  Outcome: Progressing  6/24/2022 1329 by Mikhail Gonzalez RN  Outcome: Progressing

## 2022-06-25 NOTE — PROGRESS NOTES
Dr. Ross Marie notified of gastric emptying results at this time. No new orders.  Electronically signed by Marianne Ramos RN on 6/25/2022 at 2:08 PM

## 2022-06-26 VITALS
WEIGHT: 188.3 LBS | TEMPERATURE: 97.6 F | DIASTOLIC BLOOD PRESSURE: 77 MMHG | BODY MASS INDEX: 27.89 KG/M2 | RESPIRATION RATE: 18 BRPM | HEART RATE: 64 BPM | SYSTOLIC BLOOD PRESSURE: 128 MMHG | OXYGEN SATURATION: 100 % | HEIGHT: 69 IN

## 2022-06-26 PROBLEM — E11.43 GASTROPARESIS DUE TO DM (HCC): Chronic | Status: ACTIVE | Noted: 2022-06-26

## 2022-06-26 PROBLEM — R11.2 INTRACTABLE NAUSEA AND VOMITING: Status: RESOLVED | Noted: 2022-06-23 | Resolved: 2022-06-26

## 2022-06-26 PROBLEM — E86.0 DEHYDRATION: Status: RESOLVED | Noted: 2022-06-23 | Resolved: 2022-06-26

## 2022-06-26 PROBLEM — K31.84 GASTROPARESIS DUE TO DM (HCC): Chronic | Status: ACTIVE | Noted: 2022-06-26

## 2022-06-26 PROBLEM — N17.9 AKI (ACUTE KIDNEY INJURY) (HCC): Status: RESOLVED | Noted: 2022-06-23 | Resolved: 2022-06-26

## 2022-06-26 LAB
ALBUMIN SERPL-MCNC: 3.2 G/DL (ref 3.5–5.2)
ALP BLD-CCNC: 71 U/L (ref 40–129)
ALT SERPL-CCNC: 8 U/L (ref 0–40)
ANION GAP SERPL CALCULATED.3IONS-SCNC: 9 MMOL/L (ref 7–16)
AST SERPL-CCNC: 11 U/L (ref 0–39)
BASOPHILS ABSOLUTE: 0.05 E9/L (ref 0–0.2)
BASOPHILS RELATIVE PERCENT: 0.7 % (ref 0–2)
BILIRUB SERPL-MCNC: 0.3 MG/DL (ref 0–1.2)
BUN BLDV-MCNC: 8 MG/DL (ref 6–20)
CALCIUM SERPL-MCNC: 7.8 MG/DL (ref 8.6–10.2)
CHLORIDE BLD-SCNC: 110 MMOL/L (ref 98–107)
CO2: 18 MMOL/L (ref 22–29)
CREAT SERPL-MCNC: 1 MG/DL (ref 0.7–1.2)
EOSINOPHILS ABSOLUTE: 0.14 E9/L (ref 0.05–0.5)
EOSINOPHILS RELATIVE PERCENT: 1.9 % (ref 0–6)
GFR AFRICAN AMERICAN: >60
GFR NON-AFRICAN AMERICAN: >60 ML/MIN/1.73
GLUCOSE BLD-MCNC: 133 MG/DL (ref 74–99)
HCT VFR BLD CALC: 34 % (ref 37–54)
HEMOGLOBIN: 11.8 G/DL (ref 12.5–16.5)
IMMATURE GRANULOCYTES #: 0.01 E9/L
IMMATURE GRANULOCYTES %: 0.1 % (ref 0–5)
LYMPHOCYTES ABSOLUTE: 3.82 E9/L (ref 1.5–4)
LYMPHOCYTES RELATIVE PERCENT: 50.9 % (ref 20–42)
MCH RBC QN AUTO: 29.4 PG (ref 26–35)
MCHC RBC AUTO-ENTMCNC: 34.7 % (ref 32–34.5)
MCV RBC AUTO: 84.8 FL (ref 80–99.9)
METER GLUCOSE: 149 MG/DL (ref 74–99)
MONOCYTES ABSOLUTE: 0.48 E9/L (ref 0.1–0.95)
MONOCYTES RELATIVE PERCENT: 6.4 % (ref 2–12)
NEUTROPHILS ABSOLUTE: 3 E9/L (ref 1.8–7.3)
NEUTROPHILS RELATIVE PERCENT: 40 % (ref 43–80)
PDW BLD-RTO: 11.7 FL (ref 11.5–15)
PLATELET # BLD: 299 E9/L (ref 130–450)
PMV BLD AUTO: 8.5 FL (ref 7–12)
POTASSIUM REFLEX MAGNESIUM: 3.8 MMOL/L (ref 3.5–5)
RBC # BLD: 4.01 E12/L (ref 3.8–5.8)
SODIUM BLD-SCNC: 137 MMOL/L (ref 132–146)
TOTAL PROTEIN: 5.4 G/DL (ref 6.4–8.3)
WBC # BLD: 7.5 E9/L (ref 4.5–11.5)

## 2022-06-26 PROCEDURE — C9113 INJ PANTOPRAZOLE SODIUM, VIA: HCPCS | Performed by: INTERNAL MEDICINE

## 2022-06-26 PROCEDURE — 6370000000 HC RX 637 (ALT 250 FOR IP): Performed by: STUDENT IN AN ORGANIZED HEALTH CARE EDUCATION/TRAINING PROGRAM

## 2022-06-26 PROCEDURE — 2580000003 HC RX 258: Performed by: STUDENT IN AN ORGANIZED HEALTH CARE EDUCATION/TRAINING PROGRAM

## 2022-06-26 PROCEDURE — 6360000002 HC RX W HCPCS: Performed by: STUDENT IN AN ORGANIZED HEALTH CARE EDUCATION/TRAINING PROGRAM

## 2022-06-26 PROCEDURE — 85025 COMPLETE CBC W/AUTO DIFF WBC: CPT

## 2022-06-26 PROCEDURE — 80053 COMPREHEN METABOLIC PANEL: CPT

## 2022-06-26 PROCEDURE — 36415 COLL VENOUS BLD VENIPUNCTURE: CPT

## 2022-06-26 PROCEDURE — 82962 GLUCOSE BLOOD TEST: CPT

## 2022-06-26 PROCEDURE — 6360000002 HC RX W HCPCS: Performed by: INTERNAL MEDICINE

## 2022-06-26 PROCEDURE — 6370000000 HC RX 637 (ALT 250 FOR IP): Performed by: INTERNAL MEDICINE

## 2022-06-26 RX ORDER — METOCLOPRAMIDE 10 MG/1
10 TABLET ORAL
Qty: 120 TABLET | Refills: 0 | Status: SHIPPED | OUTPATIENT
Start: 2022-06-26

## 2022-06-26 RX ORDER — DOCUSATE SODIUM 100 MG/1
100 CAPSULE, LIQUID FILLED ORAL DAILY
Status: DISCONTINUED | OUTPATIENT
Start: 2022-06-26 | End: 2022-06-26 | Stop reason: HOSPADM

## 2022-06-26 RX ORDER — PANTOPRAZOLE SODIUM 40 MG/1
40 TABLET, DELAYED RELEASE ORAL
Qty: 30 TABLET | Refills: 0 | Status: SHIPPED | OUTPATIENT
Start: 2022-06-26

## 2022-06-26 RX ADMIN — DOCUSATE SODIUM 100 MG: 100 CAPSULE, LIQUID FILLED ORAL at 11:08

## 2022-06-26 RX ADMIN — SODIUM CHLORIDE: 9 INJECTION, SOLUTION INTRAVENOUS at 00:50

## 2022-06-26 RX ADMIN — ENOXAPARIN SODIUM 40 MG: 100 INJECTION SUBCUTANEOUS at 08:49

## 2022-06-26 RX ADMIN — HYDROXYZINE PAMOATE 25 MG: 25 CAPSULE ORAL at 08:49

## 2022-06-26 RX ADMIN — FLUVOXAMINE MALEATE 50 MG: 50 TABLET, COATED ORAL at 08:49

## 2022-06-26 RX ADMIN — ATORVASTATIN CALCIUM 40 MG: 40 TABLET, FILM COATED ORAL at 08:49

## 2022-06-26 RX ADMIN — INSULIN LISPRO 2 UNITS: 100 INJECTION, SOLUTION INTRAVENOUS; SUBCUTANEOUS at 11:09

## 2022-06-26 RX ADMIN — PANTOPRAZOLE SODIUM 40 MG: 40 INJECTION, POWDER, FOR SOLUTION INTRAVENOUS at 05:55

## 2022-06-26 RX ADMIN — SODIUM CHLORIDE, PRESERVATIVE FREE 10 ML: 5 INJECTION INTRAVENOUS at 05:56

## 2022-06-26 RX ADMIN — SODIUM CHLORIDE: 9 INJECTION, SOLUTION INTRAVENOUS at 08:46

## 2022-06-26 RX ADMIN — INSULIN GLARGINE 20 UNITS: 100 INJECTION, SOLUTION SUBCUTANEOUS at 08:50

## 2022-06-26 ASSESSMENT — PAIN SCALES - GENERAL: PAINLEVEL_OUTOF10: 0

## 2022-06-26 NOTE — DISCHARGE SUMMARY
Internal Medicine Discharge Summary    NAME: Juancho García :  1975  MRN:  [id number] Lv Hernandez MD    ADMITTED: 2022   DISCHARGED:  2022    ADMITTING PHYSICIAN: Ovi Light DO    CONSULTANT(S):   IP CONSULT TO INTERNAL MEDICINE  IP CONSULT TO SOCIAL WORK  IP CONSULT TO IV TEAM  IP CONSULT TO GI     ADMITTING DIAGNOSIS:   Dehydration [E86.0]  KHAI (acute kidney injury) ([address] [N17.9]  Non-intractable vomiting with nausea, unspecified vomiting type [R11.2]  Hypotension due to hypovolemia [I95.89, E86.1]     DISCHARGE DIAGNOSES:   Patient Active Problem List:     Diabetic peripheral neuropathy ([address]     L-S radiculopathy     DM (diabetes mellitus), type 2 ([address]     Hyperlipidemia     Benign essential HTN     Gastroparesis due to DM ([address]      BRIEF HISTORY OF PRESENT ILLNESS: Juancho García is a 49 y.o. male patient of Bart Pham MD who  has a past medical history of Anxiety, HLD (hyperlipidemia), HTN (hypertension), OCD (obsessive compulsive disorder), and Type 2 diabetes mellitus ([address] who originally had concerns including Abnormal Lab ( sent him in for abnormal labs- kidney function and lipase ). at presentation on 2022, and was found to have Dehydration [E86.0]  KHAI (acute kidney injury) ([address] [N17.9]  Non-intractable vomiting with nausea, unspecified vomiting type [R11.2]  Hypotension due to hypovolemia [I95.89, E86.1] after workup. HOSPITAL COURSE: The patient was admitted for Khai and nausea and vomitting. After IV fluids his renal function returned back to normal.  GI was consulted. Gastric Empyting study and MRCP were complete. Results as below. Does look like Diabetic Gastroparesis. PPI and Reglan were initiated. Patient felt better and will be discharged home.       BRIEF PHYSICAL EXAMINATION AND LABORATORIES ON DAY OF DISCHARGE:  VITALS:  /77   Pulse 64   Temp 97.6 °F (36.4 °C) (Oral)   Resp 18   Ht 5' 9\" (1.753 m)   Wt 188 lb 4.8 oz (85.4 kg) SpO2 100%   BMI 27.81 kg/m²   General: AAO to person, place, time, and purpose, NAD, no labored breathing  Eyes: conjunctivae/corneas clear, sclera non icteric. Skin: color, texture, and turgor normal, no rashes or lesions. Lungs: clear to auscultation bilaterally, no retractions or use of accessory muscles, no vocal fremitus, no rhonchi, no crackle, no rales  Heart: regular rate and regular rhythm, no murmur  Abdomen: soft, non-tender; bowel sounds normal; no masses,  no organomegaly  Extremities: atraumatic, no cyanosis, no edema  Neurologic: cranial nerves 2-12 grossly intact, no slurred speech. LABS[de-identified]  Recent Labs     06/24/22  0230 06/25/22  0545 06/26/22  0600    135 137   K 3.5 4.1 3.8   CL 99 108* 110*   CO2 22 19* 18*   BUN 50* 16 8   CREATININE 1.7* 1.0 1.0   GLUCOSE 103* 140* 133*   CALCIUM 7.9* 7.6* 7.8*     Recent Labs     06/24/22  0230 06/25/22  0545 06/26/22  0600   ALKPHOS 71 64 71   ALT 10 9 8   AST 13 12 11   PROT 6.1* 5.4* 5.4*   BILITOT 0.4 0.6 0.3   LABALBU 3.3* 3.0* 3.2*     Recent Labs     06/24/22  0230 06/25/22  0545 06/26/22  0600   WBC 8.5 8.0 7.5   RBC 4.19 4.00 4.01   HGB 12.3* 11.7* 11.8*   HCT 36.3* 34.2* 34.0*   MCV 86.6 85.5 84.8   MCH 29.4 29.3 29.4   MCHC 33.9 34.2 34.7*   RDW 11.6 11.5 11.7    287 299   MPV 8.6 8.3 8.5     Lab Results   Component Value Date    LABA1C 7.9 (H) 06/23/2022     No results found for: INR, PROTIME   Lab Results   Component Value Date    TSH 1.340 06/23/2022     Lab Results   Component Value Date    TRIG 141 06/25/2022    HDL 32 06/25/2022    LDLCALC 30 06/25/2022     No results for input(s): CKTOTAL, CKMB, TROPONINI in the last 72 hours. Recent Labs     06/24/22  0230   MG 1.8       No results for input(s): PHART, PO2ART, EEA6QUD, DPY5ZBM, BEART, G4QJZPBF in the last 72 hours. No results for input(s): LACTA in the last 72 hours. No results for input(s): BNP in the last 72 hours.   Recent Labs     06/24/22  0230   LIPASE 70*     No results for input(s): IONCA in the last 72 hours. IMAGING:  CT ABDOMEN PELVIS WO CONTRAST Additional Contrast? None    Result Date: 6/23/2022  EXAMINATION: CT OF THE ABDOMEN AND PELVIS WITHOUT CONTRAST 6/23/2022 11:14 am TECHNIQUE: CT of the abdomen and pelvis was performed without the administration of intravenous contrast. Multiplanar reformatted images are provided for review. Automated exposure control, iterative reconstruction, and/or weight based adjustment of the mA/kV was utilized to reduce the radiation dose to as low as reasonably achievable. COMPARISON: CT abdomen and pelvis 07/04/2021 HISTORY: ORDERING SYSTEM PROVIDED HISTORY: nausea and vomiting TECHNOLOGIST PROVIDED HISTORY: Additional Contrast?->None Reason for exam:->nausea and vomiting Decision Support Exception - unselect if not a suspected or confirmed emergency medical condition->Emergency Medical Condition (MA) FINDINGS: There is a nonobstructive calculus in the lower pole of the left kidney which measures approximately 2 mm in size. There is no additional evidence of urolithiasis. There is no hydronephrosis. The urinary bladder is unremarkable in appearance. Evaluation of the solid intra-abdominal organs is limited without administration of intravenous contrast.  However, the liver, pancreas, and adrenal glands are unremarkable. There is a low-density lesion along the anterior aspect of the spleen which measures approximately 8 mm in size and likely represents a benign finding such as cyst.  There are bilateral renal cysts. The gallbladder is intact without evidence of biliary ductal dilatation. There is no evidence of bowel obstruction, pneumoperitoneum, or ascites. There is colonic diverticulosis without evidence of diverticulitis. The appendix is unremarkable in appearance. There is arteriosclerosis without abdominal aortic aneurysm. There is a small hiatal hernia. There are calcifications within the coronary arteries.   There is minimal dependent subsegmental atelectasis and/or scarring within the bilateral lower lobes. There are degenerative changes within the spine. There is evidence of prior right inguinal hernia repair. 1. No acute abnormality. 2. Left nonobstructive nephrolithiasis. 3. Small hiatal hernia. 4. Colonic diverticulosis without evidence diverticulitis. NM GASTRIC EMPTYING    Result Date: 6/25/2022  EXAMINATION: NUCLEAR MEDICINE GASTRIC EMPTYING STUDY 6/25/2022 7:22 am TECHNIQUE: Following ingestion of a standard solid meal labeled with 1.0 mCi Tc 99m sulfur colloid, planar images of the chest and abdomen were obtained at 0, 1, 2 and 4 hours in both anterior and posterior projections. Regions of interest were drawn around the stomach and geometric means were calculated. All medications capable of altering motility were held. COMPARISON: None. HISTORY: ORDERING SYSTEM PROVIDED HISTORY: probable diabetic gastroparesis TECHNOLOGIST PROVIDED HISTORY: Reason for exam:->probable diabetic gastroparesis What reading provider will be dictating this exam?->CRC FINDINGS: The gastric emptying were calculated as follows: At 60 min, there is 18% emptying of the stomach. (Normal range is 10-70%) At 120 min, there is 26% emptying of the stomach. (Normal is >40%) At 240 min, there is 47% emptying of the stomach. (Normal is >90%) No significant esophageal retention, hiatal hernia or reflux was observed. Abnormal gastric emptying of solids with delay seen in gastric emptying. [facility]      MRI ABDOMEN W WO CONTRAST MRCP    Result Date: 6/25/2022  EXAMINATION: MRI OF THE ABDOMEN WITH AND WITHOUT CONTRAST AND MRCP 6/25/2022 3:26 pm TECHNIQUE: Multiplanar multisequence MRI of the abdomen was performed with and without the administration of intravenous contrast.  After initial T2 axial and coronal images, thick slab, thin slab and 3D coronal MRCP sequences were obtained without the administration of intravenous contrast.  MIP images are provided for review. COMPARISON: CT abdomen and pelvis dated 06/23/2022 HISTORY: ORDERING SYSTEM PROVIDED HISTORY: Pancreatitis TECHNOLOGIST PROVIDED HISTORY: Reason for exam:->Pancreatitis FINDINGS: Lung bases are clear. Liver demonstrates homogeneous appearance of the parenchyma without focal liver lesion specifically no T2 hyperintense focus of abnormality. No signal dropout on opposed phase imaging to suggest hepatic steatosis. Gallbladder: Gallbladder is Bile Ducts: No intrahepatic biliary dilatation. Common bile duct is 5 mm in diameter at the level of the nav hepatis without filling defect identified to suggest choledocholithiasis demonstrating normal tapering to the level of the ampulla no discrete irregularity. Pancreatic Duct: Overall normal caliber and tapering of the pancreatic duct without filling defect or distinct irregularity. No pancreas divisum anatomy present. Pancreas without abnormal hypoenhancing focus atrophy or blunted distal margin. No acute inflammation. Other: Spleen, adrenals and kidneys unremarkable. No bulky retroperitoneal adenopathy or abnormality of the nonaneurysmal abdominal aorta. GI tract partially visualized and unremarkable. No inflammatory findings or ascites present. No aggressive bone marrow signal findings or soft tissue findings. No acute inflammatory findings of the pancreas. No pancreas divisum anatomy biliary dilatation or cholelithiasis/choledocholithiasis. No focal fluid collection within or adjacent to the pancreas to suggest pseudocyst formation or abscess.      US RETROPERITONEAL COMPLETE    Result Date: 6/23/2022  EXAMINATION: RETROPERITONEAL ULTRASOUND OF THE KIDNEYS AND URINARY BLADDER 6/23/2022 COMPARISON: CT abdomen 06/23/2022 HISTORY: ORDERING SYSTEM PROVIDED HISTORY: KHAI TECHNOLOGIST PROVIDED HISTORY: Reason for exam:->KHAI What reading provider will be dictating this exam?->CRC FINDINGS: Multiple longitudinal and transverse grayscale images were obtained of the bilateral kidneys and urinary bladder. The right kidney measures approximately 11.9 cm in length, and the left kidney measures approximately 11.7 cm in length. There is anechoic cyst in the mid right kidney which measures approximately 1.9 cm in size. There is a cyst in the mid left kidney measuring approximately 1.7 cm in size. There is a nonobstructive calculus in the lower pole left kidney on CT which is not appreciated on ultrasound. There is no evidence of solid mass or hydronephrosis bilaterally. Renal cortical thickness and echotexture appear to be within normal limits. The urinary bladder is unremarkable in appearance with prevoid volume estimated at 577 mL. Images were not obtained postvoid. 1. Benign bilateral renal cysts. 2. Nonobstructive calculus in the lower pole left kidney on CT is not appreciated with ultrasound. 3. There is distention of the urinary bladder which is otherwise unremarkable in appearance. DISPOSITION:  The patient's condition is good. At this time the patient is without objective evidence of an acute process requiring continuing hospitalization or inpatient management. They are stable for discharge with outpatient follow-up. I have spoken with the patient and discussed the results of the current hospitalization, in addition to providing specific details for the plan of care and counseling regarding the diagnosis and prognosis. The plan has been discussed in detail and they are aware of the specific conditions for emergent return, as well as the importance of follow-up.   Their questions are answered at this time and they are agreeable with the plan for discharge to home    DISCHARGE MEDICATIONS:      Medication List      START taking these medications    metoclopramide 10 MG tablet  Commonly known as: REGLAN  Take 1 tablet by mouth 3 times daily (with meals)     pantoprazole 40 MG tablet  Commonly known as: PROTONIX  Take 1 tablet by mouth every morning (before breakfast)        CONTINUE taking these medications    atorvastatin 40 MG tablet  Commonly known as: LIPITOR     diphenhydrAMINE 25 MG capsule  Commonly known as: Benadryl Allergy  Take 1 capsule by mouth every 8 hours as needed (nausea, vomiting, TO TAKE WITH REGLAN) Take with 1 dose of prescribed REGLAN (metoclopramide).      DULoxetine 60 MG extended release capsule  Commonly known as: CYMBALTA     fluvoxaMINE 50 MG tablet  Commonly known as: LUVOX     hydrOXYzine pamoate 25 MG capsule  Commonly known as: VISTARIL     ibuprofen 800 MG tablet  Commonly known as: IBU  Take 1 tablet by mouth every 8 hours as needed for Pain     Lantus SoloStar 100 UNIT/ML injection pen  Generic drug: insulin glargine     lisinopril 5 MG tablet  Commonly known as: PRINIVIL;ZESTRIL     metFORMIN 500 MG tablet  Commonly known as: GLUCOPHAGE     traZODone 50 MG tablet  Commonly known as: DESYREL     vitamin D-3 25 MCG (1000 UT) Caps        STOP taking these medications    ondansetron 4 MG disintegrating tablet  Commonly known as: Zofran ODT           Where to Get Your Medications      These medications were sent to [facility], [address] [place] [address] -  [phone] - F [phone]  [address] [facility], [address] [place] [address]    Phone: [phone]   · metoclopramide 10 MG tablet  · pantoprazole 40 MG tablet         Current Discharge Medication List      CONTINUE these medications which have CHANGED    Details   metoclopramide (REGLAN) 10 MG tablet Take 1 tablet by mouth 3 times daily (with meals)  Qty: 120 tablet, Refills: 0           Current Discharge Medication List      STOP taking these medications       ondansetron (ZOFRAN ODT) 4 MG disintegrating tablet Comments:   Reason for Stopping:         ondansetron (ZOFRAN) 4 MG tablet Comments:   Reason for Stopping:         famotidine (PEPCID) 20 MG tablet Comments:   Reason for Stopping:         Multiple Vitamins-Minerals (MULTIVITAMIN ADULTS PO) Comments:   Reason for Stopping:         Alpha-Lipoic Acid 600 MG CAPS Comments:   Reason for Stopping:             Current Discharge Medication List      START taking these medications    Details   pantoprazole (PROTONIX) 40 MG tablet Take 1 tablet by mouth every morning (before breakfast)  Qty: 30 tablet, Refills: 0             FOLLOW UP/INSTRUCTIONS:  · Follow-up with primary care physician in 7-14 days. · Follow-up with the consults listed above as directed by them. · Resume home medications and take new medications as directed. · If recurrence of symptoms go to the ED. · Activity: as tolerated  · Diet: diabetic  · Oxygenation: none    Preparing for this patient's discharge, including paperwork, orders, instructions, and meeting with patient did required 35 minutes.     Electronically signed by Tiffany Glass MD on 6/26/2022 at 11:03 AM

## 2023-05-31 ENCOUNTER — OFFICE VISIT (OUTPATIENT)
Dept: ENDOCRINOLOGY | Age: 48
End: 2023-05-31
Payer: COMMERCIAL

## 2023-05-31 ENCOUNTER — FOLLOWUP TELEPHONE ENCOUNTER (OUTPATIENT)
Dept: ENDOCRINOLOGY | Age: 48
End: 2023-05-31

## 2023-05-31 VITALS
SYSTOLIC BLOOD PRESSURE: 142 MMHG | OXYGEN SATURATION: 99 % | RESPIRATION RATE: 18 BRPM | WEIGHT: 218 LBS | BODY MASS INDEX: 32.29 KG/M2 | DIASTOLIC BLOOD PRESSURE: 87 MMHG | HEART RATE: 84 BPM | HEIGHT: 69 IN

## 2023-05-31 DIAGNOSIS — E11.9 TYPE 2 DIABETES MELLITUS WITHOUT COMPLICATION, WITH LONG-TERM CURRENT USE OF INSULIN (HCC): Primary | ICD-10-CM

## 2023-05-31 DIAGNOSIS — Z79.4 TYPE 2 DIABETES MELLITUS WITHOUT COMPLICATION, WITH LONG-TERM CURRENT USE OF INSULIN (HCC): Primary | ICD-10-CM

## 2023-05-31 DIAGNOSIS — E78.5 HYPERLIPIDEMIA, UNSPECIFIED HYPERLIPIDEMIA TYPE: ICD-10-CM

## 2023-05-31 LAB — HBA1C MFR BLD: 10.6 %

## 2023-05-31 PROCEDURE — 99204 OFFICE O/P NEW MOD 45 MIN: CPT | Performed by: INTERNAL MEDICINE

## 2023-05-31 PROCEDURE — 3079F DIAST BP 80-89 MM HG: CPT | Performed by: INTERNAL MEDICINE

## 2023-05-31 PROCEDURE — 3046F HEMOGLOBIN A1C LEVEL >9.0%: CPT | Performed by: INTERNAL MEDICINE

## 2023-05-31 PROCEDURE — 3077F SYST BP >= 140 MM HG: CPT | Performed by: INTERNAL MEDICINE

## 2023-05-31 PROCEDURE — 83036 HEMOGLOBIN GLYCOSYLATED A1C: CPT | Performed by: INTERNAL MEDICINE

## 2023-05-31 RX ORDER — INSULIN GLARGINE 100 [IU]/ML
60 INJECTION, SOLUTION SUBCUTANEOUS EVERY MORNING
Qty: 15 ADJUSTABLE DOSE PRE-FILLED PEN SYRINGE | Refills: 4 | Status: SHIPPED | OUTPATIENT
Start: 2023-05-31

## 2023-05-31 RX ORDER — BLOOD-GLUCOSE SENSOR
EACH MISCELLANEOUS
Qty: 6 EACH | Refills: 3 | Status: SHIPPED | OUTPATIENT
Start: 2023-05-31 | End: 2023-06-02 | Stop reason: SDUPTHER

## 2023-05-31 NOTE — TELEPHONE ENCOUNTER
Met with patient in endo office for diabetes education. He has had diabetes x10 years, no diabetes classes. Declines at this time. Per recall, diet is very high in saturated fat and low in fiber. Educated on Diabetes Plate Method and healthy food choices. Read CHO content of food correctly on nutrition facts using food label. Discussed lean proteins, low fat, and high fiber. Reviewed low sodium and avoiding added sugars. Reviewed portion sizes and benefits of measuring foods. Provided list of meal and snack ideas for blood sugar control.      Celia Bartholomew RDN LD

## 2023-05-31 NOTE — PROGRESS NOTES
[address] [place] [address] Department of Endocrinology Diabetes and Metabolism   [address] [facility] [address]   Phone: [phone]  Fax: [phone]    Date of Service: 5/31/2023  Primary Care Physician: Enriqueta Mccartney MD  Referring physician: Kyara Ceron  Provider: Lucius Partida MD    Reason for the visit:  DM type 2     History of Present Illness: The history is provided by the patient. No  was used. Accuracy of the patient data is excellent. Harjeet Owen is a very pleasant 51 y.o. male seen today for diabetes management     Harjeet Owen was diagnosed with diabetes in his late 29's and currently on Lantus 54u daily in the morning, Humalog 20u with meals.   Off Metformin after started Humalog   The patient has been checking blood sugar daily   Most recent A1c results summarized below  Lab Results   Component Value Date/Time    LABA1C 10.6 05/31/2023 01:03 PM    LABA1C 7.9 06/23/2022 06:00 PM       Patient has had no hypoglycemic episodes   The patient hasn't been mindful of what has been eating and wasn't following diabetes diet    I reviewed current medications and the patient has no issues with diabetes medications  The patient is due for an eye exam.  no h/o diabetic retinopathy  The patient seeing podiatrist every few weeks, performs her own feet care. + blister in the bottom of Lt foot   Microvascular complications:  No Retinopathy, no Nephropathy +  Neuropathy   Macrovascular complications: no CAD, PVD, or Stroke  The patient receives Flushot every year      PAST MEDICAL HISTORY   Past Medical History:   Diagnosis Date    Anxiety     HLD (hyperlipidemia)     HTN (hypertension)     OCD (obsessive compulsive disorder)     Type 2 diabetes mellitus ([facility]        PAST SURGICAL HISTORY   Past Surgical History:   Procedure Laterality Date    FOOT TENDON SURGERY Right     INGUINAL HERNIA REPAIR      WRIST SURGERY Right        SOCIAL HISTORY   Tobacco:

## 2023-05-31 NOTE — PATIENT INSTRUCTIONS
Recommendations for today's visit  Start Jardiance 10 mg daily in the morning   Take Lantus 60 units daily in the morning   Take Humalog 20 units with meals + sliding scale   Blood sugar 150-200: add 3 Units of Humalog  Blood sugar 201-250 : Add 6 Units of Humalog  Blood Sugar 251 - 300: Add 9 Units of Humalog  Blood Sugar 301-350: Add 12  Units of Humalog  Blood Sugar 350-400: Add 15 Units of Humalog  Blood sugar  >400: Add 18 Units of Humalog     Check Blood sugar 4 times/day before meals and at bedtime and send us sugar log in a week    These are your blood sugar, blood pressure, cholesterol and A1c goals:  Blood sugar fastin mg/dl to 130 mg/dl  Blood sugar before meals: <150 mg/dl  Peak blood sugar lower than 180 mg/dl  A1c: between 6.5 - 7.5%    I you have any questions please call Dr. Loco Glover office     Yefri Soni MD  Endocrinologist, [facility])   [address] [facility], [address] [place] [address],[facility] [address] [id number]   Phone: [phone]  Fax: [phone]  Email: [email or web address]

## 2023-06-01 ENCOUNTER — TELEPHONE (OUTPATIENT)
Dept: ENDOCRINOLOGY | Age: 48
End: 2023-06-01

## 2023-06-02 DIAGNOSIS — Z79.4 TYPE 2 DIABETES MELLITUS WITHOUT COMPLICATION, WITH LONG-TERM CURRENT USE OF INSULIN (HCC): ICD-10-CM

## 2023-06-02 DIAGNOSIS — E11.9 TYPE 2 DIABETES MELLITUS WITHOUT COMPLICATION, WITH LONG-TERM CURRENT USE OF INSULIN (HCC): ICD-10-CM

## 2023-06-02 RX ORDER — BLOOD-GLUCOSE SENSOR
EACH MISCELLANEOUS
Qty: 6 EACH | Refills: 3 | Status: SHIPPED | OUTPATIENT
Start: 2023-06-02

## 2023-06-07 DIAGNOSIS — E11.9 TYPE 2 DIABETES MELLITUS WITHOUT COMPLICATION, WITH LONG-TERM CURRENT USE OF INSULIN (HCC): Primary | ICD-10-CM

## 2023-06-07 DIAGNOSIS — Z79.4 TYPE 2 DIABETES MELLITUS WITHOUT COMPLICATION, WITH LONG-TERM CURRENT USE OF INSULIN (HCC): Primary | ICD-10-CM

## 2023-06-07 RX ORDER — CANAGLIFLOZIN 100 MG/1
TABLET, FILM COATED ORAL
Qty: 30 TABLET | Refills: 5 | Status: SHIPPED | OUTPATIENT
Start: 2023-06-07

## 2023-06-14 ENCOUNTER — TELEPHONE (OUTPATIENT)
Dept: ENDOCRINOLOGY | Age: 48
End: 2023-06-14

## 2023-07-12 ENCOUNTER — OFFICE VISIT (OUTPATIENT)
Dept: ENDOCRINOLOGY | Age: 48
End: 2023-07-12

## 2023-07-12 VITALS
DIASTOLIC BLOOD PRESSURE: 95 MMHG | WEIGHT: 231 LBS | RESPIRATION RATE: 18 BRPM | BODY MASS INDEX: 35.01 KG/M2 | SYSTOLIC BLOOD PRESSURE: 166 MMHG | HEART RATE: 74 BPM | OXYGEN SATURATION: 99 % | HEIGHT: 68 IN

## 2023-07-12 DIAGNOSIS — Z79.4 TYPE 2 DIABETES MELLITUS WITHOUT COMPLICATION, WITH LONG-TERM CURRENT USE OF INSULIN (HCC): Primary | ICD-10-CM

## 2023-07-12 DIAGNOSIS — Z91.119 DIETARY NONCOMPLIANCE: ICD-10-CM

## 2023-07-12 DIAGNOSIS — E11.9 TYPE 2 DIABETES MELLITUS WITHOUT COMPLICATION, WITH LONG-TERM CURRENT USE OF INSULIN (HCC): Primary | ICD-10-CM

## 2023-07-12 DIAGNOSIS — E78.5 HYPERLIPIDEMIA, UNSPECIFIED HYPERLIPIDEMIA TYPE: ICD-10-CM

## 2023-07-12 LAB — HBA1C MFR BLD: 9.1 %

## 2023-07-12 NOTE — PROGRESS NOTES
lab:  Lab Results   Component Value Date/Time    WBC 7.5 06/26/2022 06:00 AM    RBC 4.01 06/26/2022 06:00 AM    HGB 11.8 (L) 06/26/2022 06:00 AM    HCT 34.0 (L) 06/26/2022 06:00 AM    MCV 84.8 06/26/2022 06:00 AM    MCH 29.4 06/26/2022 06:00 AM    MCHC 34.7 (H) 06/26/2022 06:00 AM    RDW 11.7 06/26/2022 06:00 AM     06/26/2022 06:00 AM    MPV 8.5 06/26/2022 06:00 AM      Lab Results   Component Value Date/Time     06/26/2022 06:00 AM    K 3.8 06/26/2022 06:00 AM    CO2 18 (L) 06/26/2022 06:00 AM    BUN 8 06/26/2022 06:00 AM    CREATININE 1.0 06/26/2022 06:00 AM    CALCIUM 7.8 (L) 06/26/2022 06:00 AM    LABGLOM >60 06/26/2022 06:00 AM    GFRAA >60 06/26/2022 06:00 AM      Lab Results   Component Value Date/Time    TSH 1.340 06/23/2022 06:00 PM     Lab Results   Component Value Date/Time    LABA1C 10.6 05/31/2023 01:03 PM    GLUCOSE 133 06/26/2022 06:00 AM    LABCREA 112 06/23/2022 09:10 PM     Lab Results   Component Value Date/Time    LABA1C 10.6 05/31/2023 01:03 PM    LABA1C 7.9 06/23/2022 06:00 PM     Lab Results   Component Value Date/Time    TRIG 141 06/25/2022 05:45 AM    HDL 32 06/25/2022 05:45 AM    LDLCALC 30 06/25/2022 05:45 AM    CHOL 90 06/25/2022 05:45 AM     No results found for: Monico Balbuena, a 51 y.o.-old male seen in for the following issues     Diabetes Mellitus Type  2   Patient's diabetes is uncontrol , but he admits poor compliance with diet and insulin therapy recently. Continue Invokana at 100 mg daily.   We will change insulin regimen to 60 units daily, Humalog 20 units plus high-dose sliding scale  Patient to check the blood sugar ACHS and send us a sugar log in a week  Discussed with patient A1c and blood sugar goals   Patient will need routine diabetes maintenance and prevention  The patient was referred to diabetic educator for further teaching     HLD   Lipitor 40 mg daily     Dietary noncompliance  Discussed with patient the importance

## 2023-07-16 PROBLEM — Z91.119 DIETARY NONCOMPLIANCE: Status: ACTIVE | Noted: 2023-07-16

## 2023-10-12 ENCOUNTER — OFFICE VISIT (OUTPATIENT)
Dept: ENDOCRINOLOGY | Age: 48
End: 2023-10-12
Payer: COMMERCIAL

## 2023-10-12 VITALS
OXYGEN SATURATION: 98 % | DIASTOLIC BLOOD PRESSURE: 88 MMHG | SYSTOLIC BLOOD PRESSURE: 145 MMHG | HEART RATE: 76 BPM | WEIGHT: 218 LBS | HEIGHT: 68 IN | BODY MASS INDEX: 33.04 KG/M2

## 2023-10-12 DIAGNOSIS — E11.42 TYPE 2 DIABETES MELLITUS WITH DIABETIC POLYNEUROPATHY, WITH LONG-TERM CURRENT USE OF INSULIN (HCC): ICD-10-CM

## 2023-10-12 DIAGNOSIS — E11.65 TYPE 2 DIABETES MELLITUS WITH HYPERGLYCEMIA, WITH LONG-TERM CURRENT USE OF INSULIN (HCC): Primary | ICD-10-CM

## 2023-10-12 DIAGNOSIS — Z79.4 TYPE 2 DIABETES MELLITUS WITH HYPERGLYCEMIA, WITH LONG-TERM CURRENT USE OF INSULIN (HCC): Primary | ICD-10-CM

## 2023-10-12 DIAGNOSIS — E11.43 GASTROPARESIS DUE TO DM (HCC): ICD-10-CM

## 2023-10-12 DIAGNOSIS — Z91.119 DIETARY NONCOMPLIANCE: ICD-10-CM

## 2023-10-12 DIAGNOSIS — K31.84 GASTROPARESIS DUE TO DM (HCC): ICD-10-CM

## 2023-10-12 DIAGNOSIS — E78.5 HYPERLIPIDEMIA, UNSPECIFIED HYPERLIPIDEMIA TYPE: ICD-10-CM

## 2023-10-12 DIAGNOSIS — E55.9 VITAMIN D DEFICIENCY: ICD-10-CM

## 2023-10-12 DIAGNOSIS — Z79.4 TYPE 2 DIABETES MELLITUS WITH DIABETIC POLYNEUROPATHY, WITH LONG-TERM CURRENT USE OF INSULIN (HCC): ICD-10-CM

## 2023-10-12 LAB — HBA1C MFR BLD: 10.9 %

## 2023-10-12 PROCEDURE — 3077F SYST BP >= 140 MM HG: CPT | Performed by: FAMILY MEDICINE

## 2023-10-12 PROCEDURE — 83036 HEMOGLOBIN GLYCOSYLATED A1C: CPT | Performed by: FAMILY MEDICINE

## 2023-10-12 PROCEDURE — 99214 OFFICE O/P EST MOD 30 MIN: CPT | Performed by: FAMILY MEDICINE

## 2023-10-12 PROCEDURE — 3046F HEMOGLOBIN A1C LEVEL >9.0%: CPT | Performed by: FAMILY MEDICINE

## 2023-10-12 PROCEDURE — 3079F DIAST BP 80-89 MM HG: CPT | Performed by: FAMILY MEDICINE

## 2023-10-12 RX ORDER — INSULIN LISPRO 100 [IU]/ML
INJECTION, SOLUTION INTRAVENOUS; SUBCUTANEOUS
Qty: 30 ML | Refills: 5 | Status: SHIPPED | OUTPATIENT
Start: 2023-10-12

## 2023-10-12 RX ORDER — METOCLOPRAMIDE 10 MG/1
10 TABLET ORAL
Qty: 120 TABLET | Refills: 0 | Status: SHIPPED | OUTPATIENT
Start: 2023-10-12

## 2023-10-12 NOTE — PROGRESS NOTES
no kyphosis/scoliosis    Neuro: CN intact, sensation decreased bilateral , muscle power normal  Psych: normal mood, and affect    Review of Laboratory Data:  I personally reviewed the following lab:  Lab Results   Component Value Date/Time    WBC 7.5 06/26/2022 06:00 AM    RBC 4.01 06/26/2022 06:00 AM    HGB 11.8 (L) 06/26/2022 06:00 AM    HCT 34.0 (L) 06/26/2022 06:00 AM    MCV 84.8 06/26/2022 06:00 AM    MCH 29.4 06/26/2022 06:00 AM    MCHC 34.7 (H) 06/26/2022 06:00 AM    RDW 11.7 06/26/2022 06:00 AM     06/26/2022 06:00 AM    MPV 8.5 06/26/2022 06:00 AM      Lab Results   Component Value Date/Time     06/26/2022 06:00 AM    K 3.8 06/26/2022 06:00 AM    CO2 18 (L) 06/26/2022 06:00 AM    BUN 8 06/26/2022 06:00 AM    CREATININE 1.0 06/26/2022 06:00 AM    CALCIUM 7.8 (L) 06/26/2022 06:00 AM    LABGLOM >60 06/26/2022 06:00 AM    GFRAA >60 06/26/2022 06:00 AM      Lab Results   Component Value Date/Time    TSH 1.340 06/23/2022 06:00 PM     Lab Results   Component Value Date/Time    LABA1C 10.9 10/12/2023 11:51 AM    GLUCOSE 133 06/26/2022 06:00 AM    LABCREA 112 06/23/2022 09:10 PM     Lab Results   Component Value Date/Time    LABA1C 10.9 10/12/2023 11:51 AM    LABA1C 9.1 07/12/2023 02:33 PM    LABA1C 10.6 05/31/2023 01:03 PM     Lab Results   Component Value Date/Time    TRIG 141 06/25/2022 05:45 AM    HDL 32 06/25/2022 05:45 AM    LDLCALC 30 06/25/2022 05:45 AM    CHOL 90 06/25/2022 05:45 AM     No results found for: \"VITD25\"    ASSESSMENT & RECOMMENDATIONS   Esthela Osuna, a 51 y.o.-old male seen in for the following issues     Diabetes Mellitus Type  2   Patient's diabetes is uncontrolled , but he admits poor compliance with diet and insulin therapy recently. Hgb A1C 10.9%  No changes to insulin regimen today as he is not taking as prescribed. Counseled about complications of uncontrolled diabetes.   Continue Lantus 60 units daily, Humalog 20 units plus high-dose sliding scale  Message left with Jesus Tripathi
Class II - visualization of the soft palate, fauces, and uvula

## 2024-01-17 ENCOUNTER — OFFICE VISIT (OUTPATIENT)
Dept: ENDOCRINOLOGY | Age: 49
End: 2024-01-17

## 2024-01-17 VITALS
HEART RATE: 77 BPM | DIASTOLIC BLOOD PRESSURE: 87 MMHG | OXYGEN SATURATION: 96 % | RESPIRATION RATE: 18 BRPM | HEIGHT: 68 IN | SYSTOLIC BLOOD PRESSURE: 129 MMHG | WEIGHT: 234.6 LBS | BODY MASS INDEX: 35.55 KG/M2

## 2024-01-17 DIAGNOSIS — Z79.4 TYPE 2 DIABETES MELLITUS WITH HYPERGLYCEMIA, WITH LONG-TERM CURRENT USE OF INSULIN (HCC): Primary | ICD-10-CM

## 2024-01-17 DIAGNOSIS — E11.65 TYPE 2 DIABETES MELLITUS WITH HYPERGLYCEMIA, WITH LONG-TERM CURRENT USE OF INSULIN (HCC): Primary | ICD-10-CM

## 2024-01-17 LAB — HBA1C MFR BLD: 8.6 %

## 2024-01-17 NOTE — PROGRESS NOTES
[facility] [employer] [facility] Department of Endocrinology Diabetes and Metabolism   [address] [place] [address]    Phone: [phone]  Fax: [phone]    Date of Service: 1/17/2024  Primary Care Physician: Tono Jaramillo MD  Referring physician: No ref. provider found  Provider: GERMAN Christopher CNP    Reason for the visit:  DM type 2     History of Present Illness:  The history is provided by the patient. No  was used. Accuracy of the patient data is excellent.  Ziggy Rodrigues is a very pleasant 48 y.o. male seen today for diabetes management     Ziggy Rodrigues was diagnosed with diabetes in his late 30's and currently on Medtronic 70 G insulin pump with [employer] CGM    Current pump settings: Basal 1.3, CR 7, ISF 25, target 100-1 50, AIT 2 hours, smart guard target 100    TIR 73%  Hyperglycemia 27%  Hypoglycemia 0%  Avg glucose 158  GMI 7.1%   Not consistently entering carbohydrates into pump.  Relying heavily on auto correction.  Daily auto correction average 92%    most recent A1c results summarized below  Lab Results   Component Value Date/Time    LABA1C 8.6 01/17/2024 12:15 PM    LABA1C 10.9 10/12/2023 11:51 AM    LABA1C 9.1 07/12/2023 02:33 PM     Patient reports no hypoglycemia  I reviewed current medications and the patient has no issues with diabetes medications  The patient is due for an eye exam.    The patient seeing podiatrist every few weeks, also performs his  own feet care. + blister in the bottom of Lt foot   Microvascular complications:  No Retinopathy, no Nephropathy +  Neuropathy   Macrovascular complications: no CAD, PVD, or Stroke  The patient receives Flushot every year      PAST MEDICAL HISTORY   Past Medical History:   Diagnosis Date    Anxiety     HLD (hyperlipidemia)     HTN (hypertension)     OCD (obsessive compulsive disorder)     Type 2 diabetes mellitus (HCC)        PAST SURGICAL HISTORY   Past Surgical History:   Procedure

## 2024-04-25 ENCOUNTER — OFFICE VISIT (OUTPATIENT)
Dept: ENDOCRINOLOGY | Age: 49
End: 2024-04-25
Payer: COMMERCIAL

## 2024-04-25 VITALS
DIASTOLIC BLOOD PRESSURE: 84 MMHG | HEART RATE: 66 BPM | WEIGHT: 242.2 LBS | OXYGEN SATURATION: 81 % | HEIGHT: 68 IN | RESPIRATION RATE: 18 BRPM | BODY MASS INDEX: 36.71 KG/M2 | SYSTOLIC BLOOD PRESSURE: 129 MMHG

## 2024-04-25 DIAGNOSIS — E11.65 TYPE 2 DIABETES MELLITUS WITH HYPERGLYCEMIA, WITH LONG-TERM CURRENT USE OF INSULIN (HCC): Primary | ICD-10-CM

## 2024-04-25 DIAGNOSIS — K31.84 GASTROPARESIS DUE TO DM (HCC): ICD-10-CM

## 2024-04-25 DIAGNOSIS — Z91.119 DIETARY NONCOMPLIANCE: ICD-10-CM

## 2024-04-25 DIAGNOSIS — E11.43 GASTROPARESIS DUE TO DM (HCC): ICD-10-CM

## 2024-04-25 DIAGNOSIS — Z79.4 TYPE 2 DIABETES MELLITUS WITH HYPERGLYCEMIA, WITH LONG-TERM CURRENT USE OF INSULIN (HCC): Primary | ICD-10-CM

## 2024-04-25 DIAGNOSIS — Z79.4 TYPE 2 DIABETES MELLITUS WITH DIABETIC POLYNEUROPATHY, WITH LONG-TERM CURRENT USE OF INSULIN (HCC): ICD-10-CM

## 2024-04-25 DIAGNOSIS — E11.42 TYPE 2 DIABETES MELLITUS WITH DIABETIC POLYNEUROPATHY, WITH LONG-TERM CURRENT USE OF INSULIN (HCC): ICD-10-CM

## 2024-04-25 DIAGNOSIS — E78.5 HYPERLIPIDEMIA, UNSPECIFIED HYPERLIPIDEMIA TYPE: ICD-10-CM

## 2024-04-25 LAB — HBA1C MFR BLD: 7.2 %

## 2024-04-25 PROCEDURE — 99214 OFFICE O/P EST MOD 30 MIN: CPT | Performed by: FAMILY MEDICINE

## 2024-04-25 PROCEDURE — 95251 CONT GLUC MNTR ANALYSIS I&R: CPT | Performed by: FAMILY MEDICINE

## 2024-04-25 PROCEDURE — 3051F HG A1C>EQUAL 7.0%<8.0%: CPT | Performed by: FAMILY MEDICINE

## 2024-04-25 PROCEDURE — 3074F SYST BP LT 130 MM HG: CPT | Performed by: FAMILY MEDICINE

## 2024-04-25 PROCEDURE — 83036 HEMOGLOBIN GLYCOSYLATED A1C: CPT | Performed by: FAMILY MEDICINE

## 2024-04-25 PROCEDURE — 3079F DIAST BP 80-89 MM HG: CPT | Performed by: FAMILY MEDICINE

## 2024-04-25 RX ORDER — INSULIN LISPRO 100 [IU]/ML
INJECTION, SOLUTION INTRAVENOUS; SUBCUTANEOUS
Qty: 30 ML | Refills: 11 | Status: SHIPPED | OUTPATIENT
Start: 2024-04-25

## 2024-04-25 NOTE — PROGRESS NOTES
[facility] [employer] [facility] Department of Endocrinology Diabetes and Metabolism   [address] [place] [address]    Phone: [phone]  Fax: [phone]    Date of Service: 4/25/2024  Primary Care Physician: Tono Jaramillo MD  Referring physician: No ref. provider found  Provider: GERMAN Christopher CNP    Reason for the visit:  DM type 2     History of Present Illness:  The history is provided by the patient. No  was used. Accuracy of the patient data is excellent.  Ziggy Rodrigues is a very pleasant 48 y.o. male seen today for diabetes management     Ziggy Rodrigues was diagnosed with diabetes in his late 30's and currently on Medtronic 70 G insulin pump with [employer] CGM    Current pump settings:  Basal 1.4, CR 6, ISF 25, target 100-150, AIT 2 hours, smart guard target 100    TIR 68%  Hyperglycemia 32%  Hypoglycemia 0%  Avg glucose 162  GMI 7.2%     Not consistently entering carbohydrates into pump.  Relying heavily on auto correction.  Daily auto correction average 95%    most recent A1c results summarized below  Lab Results   Component Value Date/Time    LABA1C 7.2 04/25/2024 09:40 AM    LABA1C 8.6 01/17/2024 12:15 PM    LABA1C 10.9 10/12/2023 11:51 AM     Patient reports no hypoglycemia  I reviewed current medications and the patient has no issues with diabetes medications  The patient is due for an eye exam.  Scheduled for May 2, 2024  The patient seeing podiatrist every few weeks, also performs his  own feet care. + blister in the bottom of Lt foot   Microvascular complications:  No Retinopathy, no Nephropathy +  Neuropathy   Macrovascular complications: no CAD, PVD, or Stroke  The patient receives Flushot every year      PAST MEDICAL HISTORY   Past Medical History:   Diagnosis Date    Anxiety     HLD (hyperlipidemia)     HTN (hypertension)     OCD (obsessive compulsive disorder)     Type 2 diabetes mellitus (HCC)        PAST SURGICAL HISTORY   Past Surgical

## 2024-08-05 DIAGNOSIS — Z79.4 TYPE 2 DIABETES MELLITUS WITHOUT COMPLICATION, WITH LONG-TERM CURRENT USE OF INSULIN (HCC): ICD-10-CM

## 2024-08-05 DIAGNOSIS — E11.9 TYPE 2 DIABETES MELLITUS WITHOUT COMPLICATION, WITH LONG-TERM CURRENT USE OF INSULIN (HCC): ICD-10-CM

## 2024-08-05 RX ORDER — BLOOD-GLUCOSE SENSOR
1 EACH MISCELLANEOUS
Qty: 12 EACH | Refills: 3 | Status: SHIPPED | OUTPATIENT
Start: 2024-08-05

## 2024-08-26 ENCOUNTER — OFFICE VISIT (OUTPATIENT)
Dept: ENDOCRINOLOGY | Age: 49
End: 2024-08-26
Payer: COMMERCIAL

## 2024-08-26 VITALS
RESPIRATION RATE: 18 BRPM | SYSTOLIC BLOOD PRESSURE: 128 MMHG | OXYGEN SATURATION: 81 % | WEIGHT: 224 LBS | HEIGHT: 68 IN | BODY MASS INDEX: 33.95 KG/M2 | HEART RATE: 101 BPM | DIASTOLIC BLOOD PRESSURE: 80 MMHG

## 2024-08-26 DIAGNOSIS — Z79.4 TYPE 2 DIABETES MELLITUS WITH HYPERGLYCEMIA, WITH LONG-TERM CURRENT USE OF INSULIN (HCC): Primary | ICD-10-CM

## 2024-08-26 DIAGNOSIS — E11.65 TYPE 2 DIABETES MELLITUS WITH HYPERGLYCEMIA, WITH LONG-TERM CURRENT USE OF INSULIN (HCC): Primary | ICD-10-CM

## 2024-08-26 DIAGNOSIS — K31.84 GASTROPARESIS DUE TO DM (HCC): ICD-10-CM

## 2024-08-26 DIAGNOSIS — Z79.4 TYPE 2 DIABETES MELLITUS WITH DIABETIC POLYNEUROPATHY, WITH LONG-TERM CURRENT USE OF INSULIN (HCC): ICD-10-CM

## 2024-08-26 DIAGNOSIS — E11.43 GASTROPARESIS DUE TO DM (HCC): ICD-10-CM

## 2024-08-26 DIAGNOSIS — E78.5 HYPERLIPIDEMIA, UNSPECIFIED HYPERLIPIDEMIA TYPE: ICD-10-CM

## 2024-08-26 DIAGNOSIS — E11.42 TYPE 2 DIABETES MELLITUS WITH DIABETIC POLYNEUROPATHY, WITH LONG-TERM CURRENT USE OF INSULIN (HCC): ICD-10-CM

## 2024-08-26 DIAGNOSIS — Z91.119 DIETARY NONCOMPLIANCE: ICD-10-CM

## 2024-08-26 LAB — HBA1C MFR BLD: 7.3 %

## 2024-08-26 PROCEDURE — 83036 HEMOGLOBIN GLYCOSYLATED A1C: CPT | Performed by: FAMILY MEDICINE

## 2024-08-26 PROCEDURE — 95251 CONT GLUC MNTR ANALYSIS I&R: CPT | Performed by: FAMILY MEDICINE

## 2024-08-26 PROCEDURE — 3074F SYST BP LT 130 MM HG: CPT | Performed by: FAMILY MEDICINE

## 2024-08-26 PROCEDURE — 3079F DIAST BP 80-89 MM HG: CPT | Performed by: FAMILY MEDICINE

## 2024-08-26 PROCEDURE — 99214 OFFICE O/P EST MOD 30 MIN: CPT | Performed by: FAMILY MEDICINE

## 2024-08-26 PROCEDURE — 3051F HG A1C>EQUAL 7.0%<8.0%: CPT | Performed by: FAMILY MEDICINE

## 2024-08-26 RX ORDER — ONDANSETRON 4 MG/1
4 TABLET, ORALLY DISINTEGRATING ORAL 3 TIMES DAILY PRN
Qty: 30 TABLET | Refills: 0 | Status: SHIPPED | OUTPATIENT
Start: 2024-08-26

## 2024-08-26 NOTE — PROGRESS NOTES
normocephalic non-traumatic, no exophthalmos   Neck: supple, no LN enlargement, no thyromegaly, no thyroid tenderness, no JVD.  Pulm: Clear equal air entry no added sounds, no wheezing or rhonchi    CVS: S1 + S2, no murmur, no heave. Dorsalis pedis pulse palpable   Abd: soft lax, no tenderness, no organomegaly, audible bowel sounds.   Skin: warm, no lesions, no rash. No callus, no Ulcers, No acanthosis nigricans  Musculoskeletal: No back tenderness, no kyphosis/scoliosis    Neuro: CN intact, sensation decreased bilateral , muscle power normal  Psych: normal mood, and affect    Review of Laboratory Data:  I personally reviewed the following lab:  Lab Results   Component Value Date/Time    WBC 7.5 06/26/2022 06:00 AM    RBC 4.01 06/26/2022 06:00 AM    HGB 11.8 (L) 06/26/2022 06:00 AM    HCT 34.0 (L) 06/26/2022 06:00 AM    MCV 84.8 06/26/2022 06:00 AM    MCH 29.4 06/26/2022 06:00 AM    MCHC 34.7 (H) 06/26/2022 06:00 AM    RDW 11.7 06/26/2022 06:00 AM     06/26/2022 06:00 AM    MPV 8.5 06/26/2022 06:00 AM      Lab Results   Component Value Date/Time     06/26/2022 06:00 AM    K 3.8 06/26/2022 06:00 AM    CO2 18 (L) 06/26/2022 06:00 AM    BUN 8 06/26/2022 06:00 AM    CREATININE 1.0 06/26/2022 06:00 AM    CALCIUM 7.8 (L) 06/26/2022 06:00 AM    LABGLOM >60 06/26/2022 06:00 AM    GFRAA >60 06/26/2022 06:00 AM      Lab Results   Component Value Date/Time    TSH 1.340 06/23/2022 06:00 PM     Lab Results   Component Value Date/Time    LABA1C 7.2 04/25/2024 09:40 AM    GLUCOSE 133 06/26/2022 06:00 AM     Lab Results   Component Value Date/Time    LABA1C 7.2 04/25/2024 09:40 AM    LABA1C 8.6 01/17/2024 12:15 PM    LABA1C 10.9 10/12/2023 11:51 AM     Lab Results   Component Value Date/Time    TRIG 141 06/25/2022 05:45 AM    HDL 32 06/25/2022 05:45 AM    CHOL 90 06/25/2022 05:45 AM     No results found for: \"VITD25\"    ASSESSMENT & RECOMMENDATIONS   Ziggy Rodrigues, a 49 y.o.-old male seen in for the following issues  11/26/2024) for Type 2 DM.    The above issues were reviewed with the patient who understood and agreed with the plan.    Thank you for allowing us to participate in the care of this patient. Please do not hesitate to contact us with any additional questions.    Diagnosis Orders   1. Type 2 diabetes mellitus with hyperglycemia, with long-term current use of insulin ([facility])  GLUCOSE MONITOR, 72 HOUR, PHYS INTERP      2. Type 2 diabetes mellitus with diabetic polyneuropathy, with long-term current use of insulin ([facility])        3. Gastroparesis due to DM ([facility])  ondansetron (ZOFRAN-ODT) 4 MG disintegrating tablet      4. Hyperlipidemia, unspecified hyperlipidemia type        5. Dietary noncompliance                      GERMAN Christopher CNP      [facility]   [address] [place] [address].  [address]  [place] [address]  Phone: [phone]  Fax: [phone]   --------------------------------------------  An electronic signature was used to authenticate this note. GERMAN Christopher CNP   on 8/26/2024 at 10:37 AM

## 2024-10-02 ENCOUNTER — HOSPITAL ENCOUNTER (OUTPATIENT)
Age: 49
Discharge: HOME | End: 2024-10-04
Payer: COMMERCIAL

## 2024-10-02 ENCOUNTER — HOSPITAL ENCOUNTER (OUTPATIENT)
Dept: GENERAL RADIOLOGY | Age: 49
Discharge: HOME | End: 2024-10-04
Payer: COMMERCIAL

## 2024-10-02 DIAGNOSIS — M54.50 LOW BACK PAIN, UNSPECIFIED BACK PAIN LATERALITY, UNSPECIFIED CHRONICITY, UNSPECIFIED WHETHER SCIATICA PRESENT: ICD-10-CM

## 2024-10-02 PROCEDURE — 72110 X-RAY EXAM L-2 SPINE 4/>VWS: CPT

## 2025-04-24 RX ORDER — INSULIN LISPRO 100 [IU]/ML
INJECTION, SOLUTION INTRAVENOUS; SUBCUTANEOUS
Qty: 30 ML | Refills: 0 | Status: SHIPPED | OUTPATIENT
Start: 2025-04-24

## 2025-05-05 DIAGNOSIS — Z79.4 TYPE 2 DIABETES MELLITUS WITH HYPERGLYCEMIA, WITH LONG-TERM CURRENT USE OF INSULIN (HCC): Primary | ICD-10-CM

## 2025-05-05 DIAGNOSIS — E11.65 TYPE 2 DIABETES MELLITUS WITH HYPERGLYCEMIA, WITH LONG-TERM CURRENT USE OF INSULIN (HCC): Primary | ICD-10-CM

## 2025-05-07 ENCOUNTER — OFFICE VISIT (OUTPATIENT)
Dept: ENDOCRINOLOGY | Age: 50
End: 2025-05-07

## 2025-05-07 VITALS
SYSTOLIC BLOOD PRESSURE: 144 MMHG | BODY MASS INDEX: 37.68 KG/M2 | DIASTOLIC BLOOD PRESSURE: 70 MMHG | OXYGEN SATURATION: 97 % | HEART RATE: 75 BPM | TEMPERATURE: 97.6 F | WEIGHT: 247.8 LBS

## 2025-05-07 DIAGNOSIS — E11.43 GASTROPARESIS DUE TO DM (HCC): ICD-10-CM

## 2025-05-07 DIAGNOSIS — Z91.119 DIETARY NONCOMPLIANCE: ICD-10-CM

## 2025-05-07 DIAGNOSIS — K31.84 GASTROPARESIS DUE TO DM (HCC): ICD-10-CM

## 2025-05-07 DIAGNOSIS — E11.65 TYPE 2 DIABETES MELLITUS WITH HYPERGLYCEMIA, WITH LONG-TERM CURRENT USE OF INSULIN (HCC): Primary | ICD-10-CM

## 2025-05-07 DIAGNOSIS — E78.5 HYPERLIPIDEMIA, UNSPECIFIED HYPERLIPIDEMIA TYPE: ICD-10-CM

## 2025-05-07 DIAGNOSIS — Z79.4 TYPE 2 DIABETES MELLITUS WITH DIABETIC POLYNEUROPATHY, WITH LONG-TERM CURRENT USE OF INSULIN (HCC): ICD-10-CM

## 2025-05-07 DIAGNOSIS — Z79.4 TYPE 2 DIABETES MELLITUS WITH HYPERGLYCEMIA, WITH LONG-TERM CURRENT USE OF INSULIN (HCC): Primary | ICD-10-CM

## 2025-05-07 DIAGNOSIS — E11.42 TYPE 2 DIABETES MELLITUS WITH DIABETIC POLYNEUROPATHY, WITH LONG-TERM CURRENT USE OF INSULIN (HCC): ICD-10-CM

## 2025-05-07 RX ORDER — MIRTAZAPINE 30 MG/1
30 TABLET, ORALLY DISINTEGRATING ORAL NIGHTLY
COMMUNITY
Start: 2025-04-23

## 2025-05-07 RX ORDER — INSULIN LISPRO 100 [IU]/ML
INJECTION, SOLUTION INTRAVENOUS; SUBCUTANEOUS
Qty: 30 ML | Refills: 5 | Status: SHIPPED | OUTPATIENT
Start: 2025-05-07

## 2025-05-07 NOTE — PROGRESS NOTES
patient's care team providers, and personally interpreted labs associated with the above diagnosis. I also ordered labs to further assess and manage the above addressed medical conditions.     Return in about 3 months (around 8/7/2025).    The above issues were reviewed with the patient who understood and agreed with the plan.    Thank you for allowing us to participate in the care of this patient. Please do not hesitate to contact us with any additional questions.    Diagnosis Orders   1. Type 2 diabetes mellitus with hyperglycemia, with long-term current use of insulin (HCC)        2. Type 2 diabetes mellitus with diabetic polyneuropathy, with long-term current use of insulin (HCC)        3. Gastroparesis due to DM (HCC)        4. Hyperlipidemia, unspecified hyperlipidemia type        5. Dietary noncompliance                        GERMAN Erickson      [facility]   [address] [place] [address].  [address]  [place] [address]  Phone: [phone]  Fax: [phone]   --------------------------------------------  An electronic signature was used to authenticate this note. GERMAN Erickson   on 5/7/2025 at 8:46 AM

## 2025-05-12 RX ORDER — BLOOD-GLUCOSE TRANSMITTER
EACH MISCELLANEOUS
Qty: 1 EACH | Refills: 0 | Status: SHIPPED | OUTPATIENT
Start: 2025-05-12